# Patient Record
Sex: MALE | Race: WHITE | HISPANIC OR LATINO | Employment: FULL TIME | URBAN - METROPOLITAN AREA
[De-identification: names, ages, dates, MRNs, and addresses within clinical notes are randomized per-mention and may not be internally consistent; named-entity substitution may affect disease eponyms.]

---

## 2017-01-25 ENCOUNTER — OFFICE VISIT (OUTPATIENT)
Dept: MEDICAL GROUP | Facility: CLINIC | Age: 22
End: 2017-01-25
Payer: COMMERCIAL

## 2017-01-25 VITALS
WEIGHT: 133 LBS | TEMPERATURE: 97.7 F | RESPIRATION RATE: 16 BRPM | HEART RATE: 87 BPM | OXYGEN SATURATION: 99 % | BODY MASS INDEX: 22.16 KG/M2 | DIASTOLIC BLOOD PRESSURE: 60 MMHG | SYSTOLIC BLOOD PRESSURE: 110 MMHG | HEIGHT: 65 IN

## 2017-01-25 DIAGNOSIS — B35.4 DERMATOPHYTOSIS OF THE TRUNK: ICD-10-CM

## 2017-01-25 DIAGNOSIS — R42 VERTIGO: ICD-10-CM

## 2017-01-25 PROCEDURE — 99213 OFFICE O/P EST LOW 20 MIN: CPT | Performed by: FAMILY MEDICINE

## 2017-01-25 NOTE — PROGRESS NOTES
"CC: vertigo, rash    HPI:   Brijesh presents today with the following.    1. Vertigo  He has been having increased temperature, head pressure and vertigo the past few days.  Some ear pressure.    2. Skin change  A rash right lower trunk and anterior abdomen has been present about a month. He has used left over ketoconazole which is helpful.      Patient Active Problem List    Diagnosis Date Noted   • Elevated liver enzymes 06/30/2016   • Tension headache 12/05/2013       Current Outpatient Prescriptions   Medication Sig Dispense Refill   • DPH-Lido-AlHydr-MgHydr-Simeth (FIRST-MOUTHWASH BLM) Suspension Take 10 mL by mouth every 6 hours as needed for up to 14 doses. 140 mL 0   • ketoconazole (NIZORAL) 2 % Cream Apply to affected area daily (Patient not taking: Reported on 6/26/2016) 30 g 0     No current facility-administered medications for this visit.         Allergies as of 01/25/2017 - Juan Luis as Reviewed 01/25/2017   Allergen Reaction Noted   • Tylenol  07/19/2016        ROS: As per HPI.    /60 mmHg  Pulse 87  Temp(Src) 36.5 °C (97.7 °F)  Resp 16  Ht 1.651 m (5' 5\")  Wt 60.328 kg (133 lb)  BMI 22.13 kg/m2  SpO2 99%    Physical Exam:  Gen:         Alert and oriented, No apparent distress.  Lucid and fluent.  HEENT:   EOMI, PERRLA, oropharynx well hydrated, no exudate.  TMs normal bilaterally, moderate soft wax.  Neck:       Shotty cervical adenopathy.    Lungs:     Clear to auscultation bilaterally, good air movement  CV:          Regular rate and rhythm. No murmurs, rubs or gallops.               Ext:          No clubbing, cyanosis, edema.  Skin:        Patch of slight scale and pigmentation right lateral lower trunk and anterior trunk    Assessment and Plan.   21 y.o. male with the following issues.    1. Vertigo  Possibly resolving viral reaction, unclear.    2. Dermatophytosis of the trunk  He will continue to use the ketoconazole as needed.  He will use caress or neutrogena soap, sparingly.          "

## 2017-01-25 NOTE — MR AVS SNAPSHOT
"        Brijesh MannIna   2017 1:20 PM   Office Visit   MRN: [id number]    Department:  [facility]   Dept Phone:  [phone]    Description:  Male : 1995   Provider:  Vi Mcneal M.D.           Reason for Visit     Dizziness           Allergies as of 2017     Allergen Noted Reactions    Tylenol 2016       Due to liver enzymes elevation      You were diagnosed with     Vertigo   [[id number]]       Dermatophytosis of the trunk   [[id number]]         Vital Signs     Blood Pressure Pulse Temperature Respirations Height Weight    110/60 mmHg 87 36.5 °C (97.7 °F) 16 1.651 m (5' 5\") 60.328 kg (133 lb)    Body Mass Index Oxygen Saturation Smoking Status             22.13 kg/m2 99% Never Smoker          Basic Information     Date Of Birth Sex Race Ethnicity Preferred Language    1995 Male  or  Unknown English      Problem List              ICD-10-CM Priority Class Noted - Resolved    Tension headache G44.209   2013 - Present    Elevated liver enzymes R74.8   2016 - Present      Health Maintenance        Date Due Completion Dates    IMM HPV VACCINE (1 of 3 - Male 3 Dose Series) 2005 ---    IMM INFLUENZA (1) 2016 10/16/2010    IMM DTaP/Tdap/Td Vaccine (7 - Td) 10/18/2016 10/18/2006, 1998, 1998, 1996, 1995, 1994, 1994, 1994            Current Immunizations     Dtap Vaccine 1998, 1998, 1996, 1995, 1994, 1994, 1994    Hepatitis A Vaccine, Ped/Adol 2006, 2005    Hepatitis B Vaccine Non-Recombivax (Ped/Adol) 1999, 10/9/1998, 1998    Hib Vaccine (Prp-d) Historical Data 1996, 1995, 1995, 1994    Influenza LAIV (Nasal) 10/16/2010    MMR Vaccine 1998, 1997    Measles Vaccine-Historical Data 3/27/1995    Meningococcal Conjugate Vaccine MCV4 (Menactra) 10/17/2008    OPV - Historical Data 1998, 1996, 1995, 1994, 1994, " 1994    Tdap Vaccine 10/18/2006    Varicella Vaccine Live 10/18/2006, 7/1/1998      Below and/or attached are the medications your provider expects you to take. Review all of your home medications and newly ordered medications with your provider and/or pharmacist. Follow medication instructions as directed by your provider and/or pharmacist. Please keep your medication list with you and share with your provider. Update the information when medications are discontinued, doses are changed, or new medications (including over-the-counter products) are added; and carry medication information at all times in the event of emergency situations     Allergies:  TYLENOL - (reactions not documented)               Medications  Valid as of: January 25, 2017 -  2:26 PM    Generic Name Brand Name Tablet Size Instructions for use    DPH-Lido-AlHydr-MgHydr-Simeth (Suspension) MAGIC MOUTHWASH BLM  Take 10 mL by mouth every 6 hours as needed for up to 14 doses.        Ketoconazole (Cream) NIZORAL 2 % Apply to affected area daily        .                 Medicines prescribed today were sent to:     [facility] [address] [place] [address] AT [address] [facility] [address]    Phone: [phone] Fax: [phone]    Open 24 Hours?: No      Medication refill instructions:       If your prescription bottle indicates you have medication refills left, it is not necessary to call your provider’s office. Please contact your pharmacy and they will refill your medication.    If your prescription bottle indicates you do not have any refills left, you may request refills at any time through one of the following ways: The online Mobile2Me system (except Urgent Care), by calling your provider’s office, or by asking your pharmacy to contact your provider’s office with a refill request. Medication refills are processed only during regular business hours and may not be available until the  next business day. Your provider may request additional information or to have a follow-up visit with you prior to refilling your medication.   *Please Note: Medication refills are assigned a new Rx number when refilled electronically. Your pharmacy may indicate that no refills were authorized even though a new prescription for the same medication is available at the pharmacy. Please request the medicine by name with the pharmacy before contacting your provider for a refill.           [employer] Access Code: Activation code not generated  Current [employer] Status: Active

## 2017-01-31 ENCOUNTER — OFFICE VISIT (OUTPATIENT)
Dept: MEDICAL GROUP | Facility: CLINIC | Age: 22
End: 2017-01-31
Payer: COMMERCIAL

## 2017-01-31 VITALS
HEIGHT: 65 IN | HEART RATE: 100 BPM | RESPIRATION RATE: 16 BRPM | TEMPERATURE: 98.6 F | OXYGEN SATURATION: 97 % | WEIGHT: 136 LBS | SYSTOLIC BLOOD PRESSURE: 120 MMHG | DIASTOLIC BLOOD PRESSURE: 70 MMHG | BODY MASS INDEX: 22.66 KG/M2

## 2017-01-31 DIAGNOSIS — G44.209 TENSION-TYPE HEADACHE, NOT INTRACTABLE, UNSPECIFIED CHRONICITY PATTERN: ICD-10-CM

## 2017-01-31 PROCEDURE — 99214 OFFICE O/P EST MOD 30 MIN: CPT | Performed by: NURSE PRACTITIONER

## 2017-01-31 NOTE — MR AVS SNAPSHOT
"        Brijesh MannIna   2017 5:00 PM   Office Visit   MRN: [id number]    Department:  [facility]   Dept Phone:  [phone]    Description:  Male : 1995   Provider:  ARTHUR Woody           Reason for Visit     Follow-Up headaches, vertigo      Allergies as of 2017     Allergen Noted Reactions    Tylenol 2016       Due to liver enzymes elevation      You were diagnosed with     Vertigo   [[id number]]         Vital Signs     Blood Pressure Pulse Temperature Respirations Height Weight    120/70 mmHg 100 37 °C (98.6 °F) 16 1.651 m (5' 5\") 61.689 kg (136 lb)    Body Mass Index Oxygen Saturation Smoking Status             22.63 kg/m2 97% Never Smoker          Basic Information     Date Of Birth Sex Race Ethnicity Preferred Language    1995 Male  or  Unknown English      Problem List              ICD-10-CM Priority Class Noted - Resolved    Tension headache G44.209   2013 - Present    Elevated liver enzymes R74.8   2016 - Present      Health Maintenance        Date Due Completion Dates    IMM HPV VACCINE (1 of 3 - Male 3 Dose Series) 2005 ---    IMM INFLUENZA (1) 2016 10/16/2010    IMM DTaP/Tdap/Td Vaccine (7 - Td) 10/18/2016 10/18/2006, 1998, 1998, 1996, 1995, 1994, 1994, 1994            Current Immunizations     Dtap Vaccine 1998, 1998, 1996, 1995, 1994, 1994, 1994    Hepatitis A Vaccine, Ped/Adol 2006, 2005    Hepatitis B Vaccine Non-Recombivax (Ped/Adol) 1999, 10/9/1998, 1998    Hib Vaccine (Prp-d) Historical Data 1996, 1995, 1995, 1994    Influenza LAIV (Nasal) 10/16/2010    MMR Vaccine 1998, 1997    Measles Vaccine-Historical Data 3/27/1995    Meningococcal Conjugate Vaccine MCV4 (Menactra) 10/17/2008    OPV - Historical Data 1998, 1996, 1995, 1994, 1994, 1994    Tdap Vaccine " 10/18/2006    Varicella Vaccine Live 10/18/2006, 7/1/1998      Below and/or attached are the medications your provider expects you to take. Review all of your home medications and newly ordered medications with your provider and/or pharmacist. Follow medication instructions as directed by your provider and/or pharmacist. Please keep your medication list with you and share with your provider. Update the information when medications are discontinued, doses are changed, or new medications (including over-the-counter products) are added; and carry medication information at all times in the event of emergency situations     Allergies:  TYLENOL - (reactions not documented)               Medications  Valid as of: January 31, 2017 -  5:29 PM    Generic Name Brand Name Tablet Size Instructions for use    DPH-Lido-AlHydr-MgHydr-Simeth (Suspension) MAGIC MOUTHWASH BLM  Take 10 mL by mouth every 6 hours as needed for up to 14 doses.        Ketoconazole (Cream) NIZORAL 2 % Apply to affected area daily        .                 Medicines prescribed today were sent to:     [facility] [address] [place] [address] AT [address] [facility] [address]    Phone: [phone] Fax: [phone]    Open 24 Hours?: No      Medication refill instructions:       If your prescription bottle indicates you have medication refills left, it is not necessary to call your provider’s office. Please contact your pharmacy and they will refill your medication.    If your prescription bottle indicates you do not have any refills left, you may request refills at any time through one of the following ways: The online LightUp system (except Urgent Care), by calling your provider’s office, or by asking your pharmacy to contact your provider’s office with a refill request. Medication refills are processed only during regular business hours and may not be available until the next business day. Your  provider may request additional information or to have a follow-up visit with you prior to refilling your medication.   *Please Note: Medication refills are assigned a new Rx number when refilled electronically. Your pharmacy may indicate that no refills were authorized even though a new prescription for the same medication is available at the pharmacy. Please request the medicine by name with the pharmacy before contacting your provider for a refill.           MyChart Access Code: Activation code not generated  Current [employer] Status: Active

## 2017-02-01 NOTE — PROGRESS NOTES
CC: Follow-Up        HPI:     Brijesh presents today for the followin. Tension-type headache, not intractable, unspecified chronicity pattern  Here today to FU for  on headaches. He tells me his symptoms have improved. He also has a symptom which he just states is not vertigo if he's had vertigo previously however he describes it almost as a vibration comes from the back of his head towards the front of his temples. It's a bilateral sensation.  He does not have any eye globe pain. There is no any unilateral temple pain. This vibratory sense while last second, common go throughout the day. Is self resolving.  He has not had any associated trauma to the head.      He tells me his friend had meningitis years ago and is worried this could be meningitis. He tells me is he looked up his symptoms online and is worried it could be a brain tumor.    He tells me that previous to last week's was only sleeping 4 hours a night and related to a large workload, school load, planning a wedding etc. He has since make sure he sleeping 8 hours a night and he does feel his symptoms have improved. He started eating before he went to work and maybe this helps well.  Total headaches is only had 3 since his symptoms started. They can be in different places but they seem more commonly to be at the back of the head towards the base of the neck. He describes the more as mild, pressure, constant not pulsating. He has not tried any over-the-counter pain relievers. They resolve naturally within hours. They're better if he lays down and rests.    No associated symptoms of nausea vomiting, problems with balance, changes in his vision.  Not associated with a fever. He did have one isolated event of a temperature of 100-101 last week that was associated with a viral URI.  No sweating or tearing on the face.    We did review his chart and approximately 3 years ago he appears to have a similar presentation of headaches with which described some  "dizziness. He was seen at that time and ruled out for temporal arteritis. He doesn't really remember those symptoms fail to see if they are exactly the same or not.    Current Outpatient Prescriptions   Medication Sig Dispense Refill   • DPH-Lido-AlHydr-MgHydr-Simeth (FIRST-MOUTHWASH BLM) Suspension Take 10 mL by mouth every 6 hours as needed for up to 14 doses. 140 mL 0   • ketoconazole (NIZORAL) 2 % Cream Apply to affected area daily (Patient not taking: Reported on 6/26/2016) 30 g 0     No current facility-administered medications for this visit.     Social History   Substance Use Topics   • Smoking status: Never Smoker    • Smokeless tobacco: Never Used   • Alcohol Use: No      Comment: very rarely     I reviewed patients allergies, problem list and medications today in [facility].    ROS: Any/all pertinent positives listed in the HPI, otherwise all others reviewed are negative today.      /70 mmHg  Pulse 100  Temp(Src) 37 °C (98.6 °F)  Resp 16  Ht 1.651 m (5' 5\")  Wt 61.689 kg (136 lb)  BMI 22.63 kg/m2  SpO2 97%    Exam:    Gen: Alert and oriented, No apparent distress. WDWN  Psych: A+Ox3, normal affect and mood  Skin: Warm, dry and intact. Good turgor   No rashes in visible areas.  Eye: Conjunctiva clear, lids normal  ENMT: Lips without lesions, good dentition   Oropharynx clear. TMs unremarkable bilaterally with the exclusion of some soft wax present. No pain is pressure over the frontal or maxillary sinuses bilaterally  Neck: No Lymphadenopathy, Thyromegaly, Bruits.   Trachea midline, no masses  Lungs: Clear to auscultation bilaterally, no rales or rhonchi   Unlabored respiratory effort.   CV: Regular rate and rhythm, S1, S2. No murmurs.   No Edema  Neuro:    CNs: II:PERRLA, III/IV/VI EOM without ptosis or nystagmus, V motor intact, VII facial movements without asymmetry or weakness, iX/X palate midline, XI Neck strength intact, XII tongue protuded midline.  Motor:No noted atrophy or deformity of " "motion.  Coordination: No dysmetria with rapid movements, \"finger-to-nose-to-finger\" with ease  Gait: Normal gait  Cerebellar signs: Absent  Tremors : Absent  Normal speech. Normal facial movement.  Romberg normal      Assessment and Plan.   21 y.o. male with the following issues.    1. Tension-type headache, not intractable, unspecified chronicity pattern   stable. Suspect this is more stress related given the presentation of his headaches. Reviewed with this patient. We discussed self-care in terms of making sure he gets greater than 6 hours of sleep at night, routine meals water etc.  Symptoms are improving at this point. If he has any worsening or new or changing symptoms, extremity loss of smell      Over 50% of this 25 minute visit was spent on counseling and coordination of care regarding medication management, side effects, and complications in addition to extensive review of her history, current medications and today's plan of care.    "

## 2017-03-30 ENCOUNTER — OFFICE VISIT (OUTPATIENT)
Dept: MEDICAL GROUP | Facility: CLINIC | Age: 22
End: 2017-03-30
Payer: COMMERCIAL

## 2017-03-30 VITALS
SYSTOLIC BLOOD PRESSURE: 100 MMHG | HEIGHT: 64 IN | OXYGEN SATURATION: 99 % | TEMPERATURE: 97.9 F | DIASTOLIC BLOOD PRESSURE: 62 MMHG | HEART RATE: 58 BPM | RESPIRATION RATE: 16 BRPM

## 2017-03-30 DIAGNOSIS — M25.562 ACUTE PAIN OF LEFT KNEE: ICD-10-CM

## 2017-03-30 PROCEDURE — 99213 OFFICE O/P EST LOW 20 MIN: CPT | Performed by: NURSE PRACTITIONER

## 2017-03-30 RX ORDER — IBUPROFEN 800 MG/1
800 TABLET ORAL EVERY 8 HOURS PRN
Qty: 30 TAB | Refills: 1 | Status: SHIPPED | OUTPATIENT
Start: 2017-03-30 | End: 2021-08-02

## 2017-03-30 ASSESSMENT — PAIN SCALES - GENERAL: PAINLEVEL: 3=SLIGHT PAIN

## 2017-03-30 ASSESSMENT — ENCOUNTER SYMPTOMS
FEVER: 0
CHILLS: 0
SENSORY CHANGE: 0
TINGLING: 0
MYALGIAS: 1

## 2017-03-30 NOTE — MR AVS SNAPSHOT
"        Brijesh MannIna   3/30/2017 9:20 AM   Office Visit   MRN: [id number]    Department:  [facility]   Dept Phone:  [phone]    Description:  Male : 1995   Provider:  ARTHUR Mcdaniel           Reason for Visit     Knee Pain x 2 days      Allergies as of 3/30/2017     Allergen Noted Reactions    Tylenol 2016       Due to liver enzymes elevation      You were diagnosed with     Acute pain of left knee   [[id number]]         Vital Signs     Blood Pressure Pulse Temperature Respirations Height Oxygen Saturation    100/62 mmHg 58 36.6 °C (97.9 °F) 16 1.626 m (5' 4.02\") 99%    Smoking Status                   Never Smoker            Basic Information     Date Of Birth Sex Race Ethnicity Preferred Language    1995 Male  or  Unknown English      Problem List              ICD-10-CM Priority Class Noted - Resolved    Tension headache G44.209   2013 - Present    Elevated liver enzymes R74.8   2016 - Present      Health Maintenance        Date Due Completion Dates    IMM HPV VACCINE (1 of 3 - Male 3 Dose Series) 2005 ---    IMM INFLUENZA (1) 2016 10/16/2010    IMM DTaP/Tdap/Td Vaccine (7 - Td) 10/18/2016 10/18/2006, 1998, 1998, 1996, 1995, 1994, 1994, 1994            Current Immunizations     Dtap Vaccine 1998, 1998, 1996, 1995, 1994, 1994, 1994    Hepatitis A Vaccine, Ped/Adol 2006, 2005    Hepatitis B Vaccine Non-Recombivax (Ped/Adol) 1999, 10/9/1998, 1998    Hib Vaccine (Prp-d) Historical Data 1996, 1995, 1995, 1994    Influenza LAIV (Nasal) 10/16/2010    MMR Vaccine 1998, 1997    Measles Vaccine-Historical Data 3/27/1995    Meningococcal Conjugate Vaccine MCV4 (Menactra) 10/17/2008    OPV - Historical Data 1998, 1996, 1995, 1994, 1994, 1994    Tdap Vaccine 10/18/2006    Varicella Vaccine Live " 10/18/2006, 7/1/1998      Below and/or attached are the medications your provider expects you to take. Review all of your home medications and newly ordered medications with your provider and/or pharmacist. Follow medication instructions as directed by your provider and/or pharmacist. Please keep your medication list with you and share with your provider. Update the information when medications are discontinued, doses are changed, or new medications (including over-the-counter products) are added; and carry medication information at all times in the event of emergency situations     Allergies:  TYLENOL - (reactions not documented)               Medications  Valid as of: March 30, 2017 -  9:40 AM    Generic Name Brand Name Tablet Size Instructions for use    DPH-Lido-AlHydr-MgHydr-Simeth (Suspension) MAGIC MOUTHWASH BLM  Take 10 mL by mouth every 6 hours as needed for up to 14 doses.        Ibuprofen (Tab) MOTRIN 800 MG Take 1 Tab by mouth every 8 hours as needed.        Ketoconazole (Cream) NIZORAL 2 % Apply to affected area daily        .                 Medicines prescribed today were sent to:     [facility] [address] [place] [address] AT [address] [facility] [address]    Phone: [phone] Fax: [phone]    Open 24 Hours?: No      Medication refill instructions:       If your prescription bottle indicates you have medication refills left, it is not necessary to call your provider’s office. Please contact your pharmacy and they will refill your medication.    If your prescription bottle indicates you do not have any refills left, you may request refills at any time through one of the following ways: The online [employer] system (except Urgent Care), by calling your provider’s office, or by asking your pharmacy to contact your provider’s office with a refill request. Medication refills are processed only during regular business hours and may not be  available until the next business day. Your provider may request additional information or to have a follow-up visit with you prior to refilling your medication.   *Please Note: Medication refills are assigned a new Rx number when refilled electronically. Your pharmacy may indicate that no refills were authorized even though a new prescription for the same medication is available at the pharmacy. Please request the medicine by name with the pharmacy before contacting your provider for a refill.        Instructions    Knee Sprain  A knee sprain is a tear in one of the strong, fibrous tissues that connect the bones (ligaments) in your knee. The severity of the sprain depends on how much of the ligament is torn. The tear can be either partial or complete.  CAUSES   Often, sprains are a result of a fall or injury. The force of the impact causes the fibers of your ligament to stretch too much. This excess tension causes the fibers of your ligament to tear.  SIGNS AND SYMPTOMS   You may have some loss of motion in your knee. Other symptoms include:  · Bruising.  · Pain in the knee area.  · Tenderness of the knee to the touch.  · Swelling.  DIAGNOSIS   To diagnose a knee sprain, your health care provider will physically examine your knee. Your health care provider may also suggest an X-ray exam of your knee to make sure no bones are broken.  TREATMENT   If your ligament is only partially torn, treatment usually involves keeping the knee in a fixed position (immobilization) or bracing your knee for activities that require movement for several weeks. To do this, your health care provider will apply a bandage, cast, or splint to keep your knee from moving and to support your knee during movement until it heals. For a partially torn ligament, the healing process usually takes 4-6 weeks.  If your ligament is completely torn, depending on which ligament it is, you may need surgery to reconnect the ligament to the bone or  reconstruct it. After surgery, a cast or splint may be applied and will need to stay on your knee for 4-6 weeks while your ligament heals.  HOME CARE INSTRUCTIONS  · Keep your injured knee elevated to decrease swelling.  · To ease pain and swelling, apply ice to the injured area:  ¨ Put ice in a plastic bag.  ¨ Place a towel between your skin and the bag.  ¨ Leave the ice on for 20 minutes, 2-3 times a day.  · Only take medicine for pain as directed by your health care provider.  · Do not leave your knee unprotected until pain and stiffness go away (usually 4-6 weeks).  · If you have a cast or splint, do not allow it to get wet. If you have been instructed not to remove it, cover it with a plastic bag when you shower or bathe. Do not swim.  · Your health care provider may suggest exercises for you to do during your recovery to prevent or limit permanent weakness and stiffness.  SEEK IMMEDIATE MEDICAL CARE IF:  · Your cast or splint becomes damaged.  · Your pain becomes worse.  · You have significant pain, swelling, or numbness below the cast or splint.  MAKE SURE YOU:  · Understand these instructions.  · Will watch your condition.  · Will get help right away if you are not doing well or get worse.     This information is not intended to replace advice given to you by your health care provider. Make sure you discuss any questions you have with your health care provider.     Document Released: 12/18/2006 Document Revised: 01/08/2016 Document Reviewed: 07/30/2014  [employer] Interactive Patient Education ©2016 [employer]            [employer] Access Code: Activation code not generated  Current [employer] Status: Active

## 2017-03-30 NOTE — PROGRESS NOTES
Chief Complaint   Patient presents with   • Knee Pain     x 2 days       HISTORY OF PRESENT ILLNESS: Patient is a 21 y.o. male established patient who presents today due to left knee pain. Pt reports that he went to gym on Tuesday and felt knee locked for twice but then it got better so that he did not pay much attention to it. On Wednesday, he play soccer with his sister and feels the same again. After that, he start noticing left knee swelling and pressure on knee. He is unable to place weight on his left leg. He did not apply any ice or heating pad or taking any OTC medication to alleviate the pain. He does not feel there is any physical contact to cause knee injury while playing soccer. He does not fall. He is still able to walk and bend his knee but the pain level will go from 0/10 to 4/10 when he bends his knee.       Patient Active Problem List    Diagnosis Date Noted   • Elevated liver enzymes 06/30/2016   • Tension headache 12/05/2013       Allergies:Tylenol    Current Outpatient Prescriptions   Medication Sig Dispense Refill   • DPH-Lido-AlHydr-MgHydr-Simeth (FIRST-MOUTHWASH BLM) Suspension Take 10 mL by mouth every 6 hours as needed for up to 14 doses. 140 mL 0   • ketoconazole (NIZORAL) 2 % Cream Apply to affected area daily (Patient not taking: Reported on 6/26/2016) 30 g 0     No current facility-administered medications for this visit.       Social History   Substance Use Topics   • Smoking status: Never Smoker    • Smokeless tobacco: Never Used   • Alcohol Use: No      Comment: very rarely       No family status information on file.     Family History   Problem Relation Age of Onset   • Hypertension Maternal Grandmother    • Diabetes Maternal Grandmother    • Other Mother      headache         ROS:  Review of Systems   Constitutional: Negative for fever and chills.   Musculoskeletal: Positive for myalgias.        Knee pain especially when bending   Neurological: Negative for tingling and sensory  "change.        Objective:     Blood pressure 100/62, pulse 58, temperature 36.6 °C (97.9 °F), resp. rate 16, height 1.626 m (5' 4.02\"), SpO2 99 %.     Physical Exam:  Physical Exam   Constitutional: He is oriented to person, place, and time and well-developed, well-nourished, and in no distress.   HENT:   Head: Normocephalic and atraumatic.   Eyes: Conjunctivae are normal.   Neck: Neck supple. No JVD present.   Cardiovascular: Normal rate.    Abdominal: Soft.   Musculoskeletal: Normal range of motion. He exhibits edema ( a LITTLE bit left KNEE swelling compared to right knee. ) and tenderness ( pressure to left knee. The knee is stable. Negative anterior drawer test. stable of the collateral ligment. Pt reports that most of the time he feels pressure, not pain when I am doing PE.).   Neurological: He is alert and oriented to person, place, and time. No cranial nerve deficit.   Skin: Skin is warm. No erythema.   Vitals reviewed.        Assessment/Plan:  1. Acute pain of left knee, knee sprain  - ibuprofen (MOTRIN) 800 MG Tab; Take 1 Tab by mouth every 8 hours as needed.  Dispense: 30 Tab; Refill: 1  - rest and elevate knee. Limit walking and long standing. Apply heating pad. Pt works at [employer] and need work note for today and Monday. He is off on weekend and will be able to take rest. Work note given to pt per request. He declined xray and would like to see how ibuprofen works. Future MRI might be considered if worsening pain and swelling or worsening physical function since pt was playing soccer. Pt verbalized understanding.         "

## 2017-03-30 NOTE — Clinical Note
March 30, 2017    To Whom It May Concern:         This is confirmation that Brijesh Henrynorman Santana attended his scheduled appointment with ARTHUR Mcdaniel on 3/30/17. Patient might need light work for 4-5 days. No heaving lifting, no pressure on left leg. Limit long walking and long standing to 2-3 hours per shift. Future MRI might be needed if worsening symptoms.          If you have any questions please do not hesitate to call me at the phone number listed below.    Sincerely,          JANICE McdanielRADOLPH.  [phone]

## 2017-04-20 ENCOUNTER — OFFICE VISIT (OUTPATIENT)
Dept: URGENT CARE | Facility: PHYSICIAN GROUP | Age: 22
End: 2017-04-20
Payer: COMMERCIAL

## 2017-04-20 VITALS
HEART RATE: 70 BPM | DIASTOLIC BLOOD PRESSURE: 64 MMHG | RESPIRATION RATE: 14 BRPM | OXYGEN SATURATION: 98 % | TEMPERATURE: 99.3 F | BODY MASS INDEX: 21.96 KG/M2 | WEIGHT: 128 LBS | SYSTOLIC BLOOD PRESSURE: 102 MMHG

## 2017-04-20 DIAGNOSIS — H81.13 BPPV (BENIGN PAROXYSMAL POSITIONAL VERTIGO), BILATERAL: ICD-10-CM

## 2017-04-20 DIAGNOSIS — R11.0 NAUSEA: ICD-10-CM

## 2017-04-20 PROCEDURE — 99214 OFFICE O/P EST MOD 30 MIN: CPT | Performed by: PHYSICIAN ASSISTANT

## 2017-04-20 RX ORDER — CETIRIZINE HYDROCHLORIDE 10 MG/1
10 TABLET ORAL DAILY
COMMUNITY
End: 2021-08-02

## 2017-04-20 RX ORDER — ONDANSETRON 4 MG/1
4 TABLET, FILM COATED ORAL EVERY 6 HOURS PRN
Qty: 20 EACH | Refills: 0 | Status: SHIPPED | OUTPATIENT
Start: 2017-04-20 | End: 2017-04-25

## 2017-04-20 ASSESSMENT — ENCOUNTER SYMPTOMS
CHILLS: 0
VERTIGO: 1
HEADACHES: 0
DIZZINESS: 1
ABDOMINAL PAIN: 0
JOINT SWELLING: 0
FEVER: 0
COUGH: 0
VISUAL CHANGE: 0
VOMITING: 0
NAUSEA: 1
SORE THROAT: 0
DIAPHORESIS: 0

## 2017-04-20 NOTE — Clinical Note
April 20, 2017         Patient: Brijesh Santana   YOB: 1995   Date of Visit: 4/20/2017           To Whom it May Concern:    Brijesh Santana was seen in my clinic on 4/20/2017. Please excuse this patient from work today due to recent ailment. Patient may return to work on Monday if symptoms have improved.   If you have any questions or concerns, please don't hesitate to call.        Sincerely,           Timothy Nice PA-C  Electronically Signed

## 2017-04-20 NOTE — MR AVS SNAPSHOT
Brijeshalex Wilsonmael Esther   2017 5:45 PM   Office Visit   MRN: [id number]    Department:  [facility]   Dept Phone:  [phone]    Description:  Male : 1995   Provider:  Timothy Nice PA-C           Reason for Visit     Vertigo worse with movement or position change, also nausea- pt woke to it this morning      Allergies as of 2017     Allergen Noted Reactions    Tylenol 2016       Due to liver enzymes elevation      You were diagnosed with     BPPV (benign paroxysmal positional vertigo), bilateral   [[id number]]         Vital Signs     Blood Pressure Pulse Temperature Respirations Weight Oxygen Saturation    102/64 mmHg 70 37.4 °C (99.3 °F) 14 58.06 kg (128 lb) 98%    Smoking Status                   Never Smoker            Basic Information     Date Of Birth Sex Race Ethnicity Preferred Language    1995 Male  or  Unknown English      Problem List              ICD-10-CM Priority Class Noted - Resolved    Tension headache G44.209   2013 - Present    Elevated liver enzymes R74.8   2016 - Present      Health Maintenance        Date Due Completion Dates    IMM HPV VACCINE (1 of 3 - Male 3 Dose Series) 2005 ---    IMM DTaP/Tdap/Td Vaccine (7 - Td) 10/18/2016 10/18/2006, 1998, 1998, 1996, 1995, 1994, 1994, 1994            Current Immunizations     Dtap Vaccine 1998, 1998, 1996, 1995, 1994, 1994, 1994    Hepatitis A Vaccine, Ped/Adol 2006, 2005    Hepatitis B Vaccine Non-Recombivax (Ped/Adol) 1999, 10/9/1998, 1998    Hib Vaccine (Prp-d) Historical Data 1996, 1995, 1995, 1994    Influenza LAIV (Nasal) 10/16/2010    MMR Vaccine 1998, 1997    Measles Vaccine-Historical Data 3/27/1995    Meningococcal Conjugate Vaccine MCV4 (Menactra) 10/17/2008    OPV - Historical Data 1998, 1996, 1995, 1994, 1994, 1994      Tdap Vaccine 10/18/2006    Varicella Vaccine Live 10/18/2006, 7/1/1998      Below and/or attached are the medications your provider expects you to take. Review all of your home medications and newly ordered medications with your provider and/or pharmacist. Follow medication instructions as directed by your provider and/or pharmacist. Please keep your medication list with you and share with your provider. Update the information when medications are discontinued, doses are changed, or new medications (including over-the-counter products) are added; and carry medication information at all times in the event of emergency situations     Allergies:  TYLENOL - (reactions not documented)               Medications  Valid as of: April 20, 2017 -  6:30 PM    Generic Name Brand Name Tablet Size Instructions for use    Cetirizine HCl (Tab) ZYRTEC 10 MG Take 10 mg by mouth every day.        DPH-Lido-AlHydr-MgHydr-Simeth (Suspension) MAGIC MOUTHWASH BLM  Take 10 mL by mouth every 6 hours as needed for up to 14 doses.        Ibuprofen (Tab) MOTRIN 800 MG Take 1 Tab by mouth every 8 hours as needed.        Ketoconazole (Cream) NIZORAL 2 % Apply to affected area daily        Ondansetron HCl (Tab) ZOFRAN 4 MG Take 1 Tab by mouth every 6 hours as needed for Nausea/Vomiting for up to 5 days.        .                 Medicines prescribed today were sent to:     [facility] [address] [place] [address] AT [address] [address]    Phone: [phone] Fax: [phone]    Open 24 Hours?: No      Medication refill instructions:       If your prescription bottle indicates you have medication refills left, it is not necessary to call your provider’s office. Please contact your pharmacy and they will refill your medication.    If your prescription bottle indicates you do not have any refills left, you may request refills at any time through one of the following ways: The  online [employer] system (except Urgent Care), by calling your provider’s office, or by asking your pharmacy to contact your provider’s office with a refill request. Medication refills are processed only during regular business hours and may not be available until the next business day. Your provider may request additional information or to have a follow-up visit with you prior to refilling your medication.   *Please Note: Medication refills are assigned a new Rx number when refilled electronically. Your pharmacy may indicate that no refills were authorized even though a new prescription for the same medication is available at the pharmacy. Please request the medicine by name with the pharmacy before contacting your provider for a refill.           [employer] Access Code: Activation code not generated  Current [employer] Status: Active

## 2017-04-21 ASSESSMENT — ENCOUNTER SYMPTOMS
EYE DISCHARGE: 0
SEIZURES: 0
NECK PAIN: 0
DIARRHEA: 0
SENSORY CHANGE: 0
EYE REDNESS: 0
PHOTOPHOBIA: 0
EYE PAIN: 0
MYALGIAS: 0
FOCAL WEAKNESS: 0
DOUBLE VISION: 0
SPEECH CHANGE: 0
BLURRED VISION: 0
TINGLING: 0

## 2017-04-21 NOTE — PROGRESS NOTES
"Subjective:      Brijesh Santana is a 21 y.o. male who presents with Vertigo          Pt is 22 y/o female who presents with vertigo with head movements and lying down and turning on his side for the last day. He reports similar hx. A few months ago- however that was more light-headedness and feeling unsteady. He denies any recent illness or \"really feeling that bad\". He reports nausea with the episodes however denies any vomiting.   Dizziness  This is a new problem. The current episode started yesterday. The problem occurs intermittently. The problem has been waxing and waning. Associated symptoms include nausea and vertigo. Pertinent negatives include no abdominal pain, chest pain, chills, congestion, coughing, diaphoresis, fever, headaches, joint swelling, myalgias, neck pain, rash, sore throat, visual change or vomiting. The symptoms are aggravated by bending (turning head, and lying down and moving. ). He has tried nothing for the symptoms.       Review of Systems   Constitutional: Negative for fever, chills and diaphoresis.   HENT: Negative for congestion and sore throat.    Eyes: Negative for blurred vision, double vision, photophobia, pain, discharge and redness.   Respiratory: Negative for cough.    Cardiovascular: Negative for chest pain and leg swelling.   Gastrointestinal: Positive for nausea. Negative for vomiting, abdominal pain and diarrhea.   Genitourinary: Negative for dysuria and urgency.   Musculoskeletal: Negative for myalgias, joint swelling and neck pain.   Skin: Negative for rash.   Neurological: Positive for dizziness and vertigo. Negative for tingling, sensory change, speech change, focal weakness, seizures and headaches.          Objective:     /64 mmHg  Pulse 70  Temp(Src) 37.4 °C (99.3 °F)  Resp 14  Wt 58.06 kg (128 lb)  SpO2 98%   PMH:  has a past medical history of Skin lesion of hand (2/2012).  MEDS:   Current outpatient prescriptions:   •  cetirizine (ZYRTEC) 10 MG " Tab, Take 10 mg by mouth every day., Disp: , Rfl:   •  ondansetron (ZOFRAN) 4 MG Tab tablet, Take 1 Tab by mouth every 6 hours as needed for Nausea/Vomiting for up to 5 days., Disp: 20 Each, Rfl: 0  •  ibuprofen (MOTRIN) 800 MG Tab, Take 1 Tab by mouth every 8 hours as needed., Disp: 30 Tab, Rfl: 1  •  DPH-Lido-AlHydr-MgHydr-Simeth (FIRST-MOUTHWASH BLM) Suspension, Take 10 mL by mouth every 6 hours as needed for up to 14 doses., Disp: 140 mL, Rfl: 0  •  ketoconazole (NIZORAL) 2 % Cream, Apply to affected area daily (Patient not taking: Reported on 6/26/2016), Disp: 30 g, Rfl: 0  ALLERGIES:   Allergies   Allergen Reactions   • Tylenol      Due to liver enzymes elevation     SURGHX: History reviewed. No pertinent past surgical history.  SOCHX:  reports that he has never smoked. He has never used smokeless tobacco. He reports that he does not drink alcohol or use illicit drugs.  FH: Family history was reviewed, no pertinent findings to report    Physical Exam   Constitutional: He is oriented to person, place, and time. He appears well-developed and well-nourished.   HENT:   Head: Normocephalic and atraumatic.   Right Ear: External ear normal.   Left Ear: External ear normal.   Mouth/Throat: Oropharynx is clear and moist. No oropharyngeal exudate.   Eyes: EOM are normal. Pupils are equal, round, and reactive to light.   Neck: Normal range of motion. Neck supple.   Cardiovascular: Normal rate and regular rhythm.    Pulmonary/Chest: Effort normal and breath sounds normal.   Musculoskeletal: Normal range of motion. He exhibits no edema or tenderness.   Neurological: He is alert and oriented to person, place, and time. He displays normal reflexes. No cranial nerve deficit. He exhibits normal muscle tone. Coordination normal.   Reproducible vertigo with head position. Pos. sylvia hallpike bilateral- worse on the right.   Espinoza appropriate, neg. Pronator drift, and neg. Rhomberg.      Skin: Skin is warm. No rash noted.    Psychiatric: He has a normal mood and affect. His behavior is normal.   Vitals reviewed.              Assessment/Plan:     1. BPPV (benign paroxysmal positional vertigo), bilateral  - ondansetron (ZOFRAN) 4 MG Tab tablet; Take 1 Tab by mouth every 6 hours as needed for Nausea/Vomiting for up to 5 days.  Dispense: 20 Each; Refill: 0    2. Nausea    Pt. Was given information on [facility]. To do at home- worse on the right. Work note written- until symptoms improve- avoid driving.   Zofran was written to help with nausea. Without any current neuro changes- completely asymptomatic currently.     Patient given precautionary s/sx that mandate immediate follow up and evaluation in the ED. Advised of risks of not doing so.    DDX, Supportive care, and indications for immediate follow-up discussed with patient.    Instructed to return to clinic or nearest emergency department if we are not available for any change in condition, further concerns, or worsening of symptoms.    The patient demonstrated a good understanding and agreed with the treatment plan.

## 2018-07-08 ENCOUNTER — HOSPITAL ENCOUNTER (OUTPATIENT)
Dept: RADIOLOGY | Facility: MEDICAL CENTER | Age: 23
End: 2018-07-08
Attending: PHYSICIAN ASSISTANT
Payer: COMMERCIAL

## 2018-07-08 ENCOUNTER — OFFICE VISIT (OUTPATIENT)
Dept: URGENT CARE | Facility: PHYSICIAN GROUP | Age: 23
End: 2018-07-08
Payer: COMMERCIAL

## 2018-07-08 VITALS
OXYGEN SATURATION: 96 % | DIASTOLIC BLOOD PRESSURE: 68 MMHG | RESPIRATION RATE: 16 BRPM | SYSTOLIC BLOOD PRESSURE: 104 MMHG | BODY MASS INDEX: 24.07 KG/M2 | HEART RATE: 101 BPM | WEIGHT: 141 LBS | HEIGHT: 64 IN | TEMPERATURE: 99.5 F

## 2018-07-08 DIAGNOSIS — R50.9 FEVER, UNSPECIFIED FEVER CAUSE: ICD-10-CM

## 2018-07-08 DIAGNOSIS — J06.9 ACUTE URI: Primary | ICD-10-CM

## 2018-07-08 DIAGNOSIS — R05.9 COUGH: ICD-10-CM

## 2018-07-08 DIAGNOSIS — J98.01 ACUTE BRONCHOSPASM: ICD-10-CM

## 2018-07-08 LAB
FLUAV+FLUBV AG SPEC QL IA: NEGATIVE
INT CON NEG: NEGATIVE
INT CON POS: POSITIVE

## 2018-07-08 PROCEDURE — 99214 OFFICE O/P EST MOD 30 MIN: CPT | Performed by: PHYSICIAN ASSISTANT

## 2018-07-08 PROCEDURE — 71046 X-RAY EXAM CHEST 2 VIEWS: CPT

## 2018-07-08 PROCEDURE — 87804 INFLUENZA ASSAY W/OPTIC: CPT | Performed by: PHYSICIAN ASSISTANT

## 2018-07-08 RX ORDER — PREDNISONE 20 MG/1
20 TABLET ORAL DAILY
Qty: 5 TAB | Refills: 0 | Status: SHIPPED | OUTPATIENT
Start: 2018-07-08 | End: 2018-07-13

## 2018-07-08 RX ORDER — ALBUTEROL SULFATE 90 UG/1
2 AEROSOL, METERED RESPIRATORY (INHALATION) EVERY 4 HOURS PRN
Qty: 1 INHALER | Refills: 0 | Status: SHIPPED | OUTPATIENT
Start: 2018-07-08 | End: 2021-08-02

## 2018-07-08 ASSESSMENT — ENCOUNTER SYMPTOMS
MYALGIAS: 1
SHORTNESS OF BREATH: 0
FEVER: 1
SPUTUM PRODUCTION: 1
WHEEZING: 0
HEMOPTYSIS: 0
RHINORRHEA: 1
COUGH: 1
STRIDOR: 0
SORE THROAT: 0
HEADACHES: 1

## 2018-07-08 NOTE — PROGRESS NOTES
Subjective:      Brijesh Santana is a 23 y.o. male who presents with Cough (with congestion x 4 days with fever up to 101.4 )    PMH:  has a past medical history of Skin lesion of hand (2/2012).  MEDS:   Current Outpatient Prescriptions:   •  cetirizine (ZYRTEC) 10 MG Tab, Take 10 mg by mouth every day., Disp: , Rfl:   •  ibuprofen (MOTRIN) 800 MG Tab, Take 1 Tab by mouth every 8 hours as needed., Disp: 30 Tab, Rfl: 1  •  DPH-Lido-AlHydr-MgHydr-Simeth (FIRST-MOUTHWASH BLM) Suspension, Take 10 mL by mouth every 6 hours as needed for up to 14 doses., Disp: 140 mL, Rfl: 0  •  ketoconazole (NIZORAL) 2 % Cream, Apply to affected area daily (Patient not taking: Reported on 6/26/2016), Disp: 30 g, Rfl: 0  ALLERGIES:   Allergies   Allergen Reactions   • Tylenol      Due to liver enzymes elevation     SURGHX: History reviewed. No pertinent surgical history.  SOCHX:  reports that he has never smoked. He has never used smokeless tobacco. He reports that he does not drink alcohol or use drugs.  FH: Reviewed with patient/family. Not pertinent to this complaint.          Patient presents with:  Cough: with congestion x 4 days with fever up to 101.4 . + family contacts with Flu and PNA.              Cough   This is a new problem. Episode onset: 4 days. The problem has been gradually worsening. The problem occurs every few minutes. The cough is productive of sputum. Associated symptoms include a fever, headaches, myalgias and rhinorrhea. Pertinent negatives include no hemoptysis, sore throat, shortness of breath or wheezing. The symptoms are aggravated by lying down. He has tried body position changes, OTC cough suppressant and rest for the symptoms. The treatment provided no relief. There is no history of asthma.       Review of Systems   Constitutional: Positive for fever and malaise/fatigue.   HENT: Positive for congestion and rhinorrhea. Negative for sore throat.    Respiratory: Positive for cough and sputum  "production. Negative for hemoptysis, shortness of breath, wheezing and stridor.    Musculoskeletal: Positive for myalgias.   Neurological: Positive for headaches.   All other systems reviewed and are negative.         Objective:     /68   Pulse (!) 101   Temp 37.5 °C (99.5 °F)   Resp 16   Ht 1.626 m (5' 4\")   Wt 64 kg (141 lb)   SpO2 96%   BMI 24.20 kg/m²      Physical Exam   Constitutional: He appears well-developed and well-nourished. No distress.   HENT:   Head: Normocephalic.   Right Ear: Tympanic membrane normal.   Left Ear: Tympanic membrane normal.   Nose: Rhinorrhea present.   Mouth/Throat: Uvula is midline and mucous membranes are normal. Posterior oropharyngeal erythema present.   Eyes: Pupils are equal, round, and reactive to light.   Neck: Normal range of motion. Neck supple.   Cardiovascular: Tachycardia present.    Pulmonary/Chest: Effort normal. He has no decreased breath sounds. He has no wheezes. He has rhonchi. He has no rales.   Abdominal: Soft.   Musculoskeletal: Normal range of motion.   Lymphadenopathy:     He has no cervical adenopathy.   Neurological: He is alert.   Skin: Skin is warm and dry.   Psychiatric: He has a normal mood and affect.   Nursing note and vitals reviewed.         FLU: neg  Xray images viewed and interpreted by me, confirmed by radiology:    Neg acute findings.     Assessment/Plan:     1. Acute URI  POCT Influenza A/B    DX-CHEST-2 VIEWS    albuterol 108 (90 Base) MCG/ACT Aero Soln inhalation aerosol    predniSONE (DELTASONE) 20 MG Tab   2. Fever, unspecified fever cause  POCT Influenza A/B    DX-CHEST-2 VIEWS    albuterol 108 (90 Base) MCG/ACT Aero Soln inhalation aerosol    predniSONE (DELTASONE) 20 MG Tab   3. Acute bronchospasm  POCT Influenza A/B    DX-CHEST-2 VIEWS    albuterol 108 (90 Base) MCG/ACT Aero Soln inhalation aerosol    predniSONE (DELTASONE) 20 MG Tab        Discussed that I felt this was viral in nature. Did not see any evidence of a " bacterial process. Discussed natural progression and sx care.    PT can continue OTC medications, increase fluids and rest until symptoms improve.     PT should follow up with PCP in 1-2 days for re-evaluation if symptoms have not improved.  Discussed red flags and reasons to return to UC or ED.  Pt and/or family verbalized understanding of diagnosis and follow up instructions and was offered informational handout on diagnosis.  PT discharged.

## 2018-07-11 ENCOUNTER — HOSPITAL ENCOUNTER (OUTPATIENT)
Dept: RADIOLOGY | Facility: MEDICAL CENTER | Age: 23
End: 2018-07-11
Attending: FAMILY MEDICINE
Payer: COMMERCIAL

## 2018-07-11 ENCOUNTER — OFFICE VISIT (OUTPATIENT)
Dept: URGENT CARE | Facility: PHYSICIAN GROUP | Age: 23
End: 2018-07-11
Payer: COMMERCIAL

## 2018-07-11 VITALS
RESPIRATION RATE: 16 BRPM | DIASTOLIC BLOOD PRESSURE: 78 MMHG | HEIGHT: 64 IN | SYSTOLIC BLOOD PRESSURE: 116 MMHG | TEMPERATURE: 99 F | HEART RATE: 105 BPM | OXYGEN SATURATION: 94 % | BODY MASS INDEX: 24.59 KG/M2 | WEIGHT: 144 LBS

## 2018-07-11 DIAGNOSIS — R05.9 COUGH: ICD-10-CM

## 2018-07-11 DIAGNOSIS — R50.9 FEVER, UNSPECIFIED FEVER CAUSE: ICD-10-CM

## 2018-07-11 DIAGNOSIS — J18.9 PNEUMONIA OF LEFT LOWER LOBE DUE TO INFECTIOUS ORGANISM: ICD-10-CM

## 2018-07-11 PROCEDURE — 71046 X-RAY EXAM CHEST 2 VIEWS: CPT

## 2018-07-11 PROCEDURE — 99213 OFFICE O/P EST LOW 20 MIN: CPT | Performed by: FAMILY MEDICINE

## 2018-07-11 RX ORDER — PROMETHAZINE HYDROCHLORIDE AND CODEINE PHOSPHATE 6.25; 1 MG/5ML; MG/5ML
5 SYRUP ORAL 4 TIMES DAILY PRN
Qty: 120 ML | Refills: 0 | Status: SHIPPED | OUTPATIENT
Start: 2018-07-11 | End: 2018-07-18

## 2018-07-11 RX ORDER — DOXYCYCLINE HYCLATE 100 MG
100 TABLET ORAL 2 TIMES DAILY
Qty: 20 TAB | Refills: 0 | Status: SHIPPED | OUTPATIENT
Start: 2018-07-11 | End: 2018-07-21

## 2018-07-11 ASSESSMENT — ENCOUNTER SYMPTOMS
FOCAL WEAKNESS: 0
HEADACHES: 1
EYE DISCHARGE: 0
SENSORY CHANGE: 0
MYALGIAS: 1
EYE REDNESS: 0
WEIGHT LOSS: 0

## 2018-07-11 NOTE — LETTER
July 11, 2018         Patient: Brijesh Santana   YOB: 1995   Date of Visit: 7/11/2018           To Whom it May Concern:    Brijesh Santana was seen in my clinic on 7/11/2018. Please excuse 7/11 through 7/14/2018 due to pneumonia.     Sincerely,           Rafi Franklin M.D.  Electronically Signed

## 2018-07-11 NOTE — PROGRESS NOTES
"Subjective:      Brijesh Santana is a 23 y.o. male who presents with Cough (cough, congestion x1 week; seen 3 days ago for same, no improvement)            1 week productive cough without blood in sputum. Fever, 101.7 today. +chills. +SOB/wheeze. No PMH asthma/pneumonia. Seen 3d ago with negative xray. No relief with albuterol and oral steroid. No other aggravating or alleviating factors.            Review of Systems   Constitutional: Negative for malaise/fatigue and weight loss.   HENT: Negative for congestion, ear discharge and ear pain.    Eyes: Negative for discharge and redness.   Cardiovascular: Negative for leg swelling.   Musculoskeletal: Positive for myalgias. Negative for joint pain.   Skin: Negative for rash.   Neurological: Positive for headaches. Negative for sensory change and focal weakness.     .  Medications, Allergies, and current problem list reviewed today in Epic       Objective:     /78   Pulse (!) 105   Temp 37.2 °C (99 °F)   Resp 16   Ht 1.626 m (5' 4\")   Wt 65.3 kg (144 lb)   SpO2 94%   BMI 24.72 kg/m²      Physical Exam   Constitutional: He appears well-developed and well-nourished. No distress.   HENT:   Head: Normocephalic.   Right Ear: External ear normal.   Left Ear: External ear normal.   Eyes: Conjunctivae are normal.   Neck: Neck supple. No JVD present.   Cardiovascular: Regular rhythm and normal heart sounds.    rapid   Pulmonary/Chest: Effort normal. He has rales (left base).   Neurological:   Speech is clear. Patient is appropriate and cooperative.     Skin: Skin is warm and dry. No rash noted.               Assessment/Plan:   Xray: LLL pneumonia per radiology    1. Cough  DX-CHEST-2 VIEWS    promethazine-codeine (PHENERGAN-CODEINE) 6.25-10 MG/5ML Syrup   2. Fever, unspecified fever cause  DX-CHEST-2 VIEWS   3. Pneumonia of left lower lobe due to infectious organism (HCC)  doxycycline (VIBRAMYCIN) 100 MG Tab     Differential diagnosis, natural history, " supportive care, and indications for immediate follow-up discussed at length.

## 2018-11-28 ENCOUNTER — EMPLOYEE HEALTH (OUTPATIENT)
Dept: OCCUPATIONAL MEDICINE | Facility: CLINIC | Age: 23
End: 2018-11-28

## 2018-11-28 ENCOUNTER — HOSPITAL ENCOUNTER (OUTPATIENT)
Facility: MEDICAL CENTER | Age: 23
End: 2018-11-28
Attending: PREVENTIVE MEDICINE
Payer: COMMERCIAL

## 2018-11-28 ENCOUNTER — EH NON-PROVIDER (OUTPATIENT)
Dept: OCCUPATIONAL MEDICINE | Facility: CLINIC | Age: 23
End: 2018-11-28

## 2018-11-28 VITALS
HEIGHT: 64 IN | WEIGHT: 148 LBS | OXYGEN SATURATION: 97 % | BODY MASS INDEX: 25.27 KG/M2 | SYSTOLIC BLOOD PRESSURE: 110 MMHG | TEMPERATURE: 98.6 F | RESPIRATION RATE: 16 BRPM | DIASTOLIC BLOOD PRESSURE: 66 MMHG | HEART RATE: 63 BPM

## 2018-11-28 VITALS
SYSTOLIC BLOOD PRESSURE: 110 MMHG | BODY MASS INDEX: 25.27 KG/M2 | OXYGEN SATURATION: 97 % | RESPIRATION RATE: 16 BRPM | DIASTOLIC BLOOD PRESSURE: 66 MMHG | HEIGHT: 64 IN | WEIGHT: 148 LBS | HEART RATE: 63 BPM | TEMPERATURE: 97.9 F

## 2018-11-28 DIAGNOSIS — Z02.1 PRE-EMPLOYMENT HEALTH SCREENING EXAMINATION: ICD-10-CM

## 2018-11-28 DIAGNOSIS — Z02.1 PRE-EMPLOYMENT DRUG SCREENING: ICD-10-CM

## 2018-11-28 LAB
AMP AMPHETAMINE: NORMAL
BAR BARBITURATES: NORMAL
BZO BENZODIAZEPINES: NORMAL
COC COCAINE: NORMAL
INT CON NEG: NORMAL
INT CON POS: NORMAL
MDMA ECSTASY: NORMAL
MET METHAMPHETAMINES: NORMAL
MTD METHADONE: NORMAL
OPI OPIATES: NORMAL
OXY OXYCODONE: NORMAL
PCP PHENCYCLIDINE: NORMAL
POC URINE DRUG SCREEN OCDRS: NEGATIVE
THC: NORMAL

## 2018-11-28 PROCEDURE — 8915 PR COMPREHENSIVE PHYSICAL: Performed by: PREVENTIVE MEDICINE

## 2018-11-28 PROCEDURE — 80305 DRUG TEST PRSMV DIR OPT OBS: CPT | Performed by: PREVENTIVE MEDICINE

## 2018-11-28 PROCEDURE — 86480 TB TEST CELL IMMUN MEASURE: CPT | Performed by: PREVENTIVE MEDICINE

## 2018-11-28 PROCEDURE — 90686 IIV4 VACC NO PRSV 0.5 ML IM: CPT | Performed by: PREVENTIVE MEDICINE

## 2018-11-30 LAB
M TB TUBERC IFN-G BLD QL: NEGATIVE
M TB TUBERC IFN-G/MITOGEN IGNF BLD: 0
M TB TUBERC IGNF/MITOGEN IGNF CONTROL: 51.57 [IU]/ML
MITOGEN IGNF BCKGRD COR BLD-ACNC: 0.01 [IU]/ML

## 2018-12-07 ENCOUNTER — EH NON-PROVIDER (OUTPATIENT)
Dept: OCCUPATIONAL MEDICINE | Facility: CLINIC | Age: 23
End: 2018-12-07

## 2018-12-07 DIAGNOSIS — Z71.85 IMMUNIZATION COUNSELING: ICD-10-CM

## 2019-05-05 ENCOUNTER — OFFICE VISIT (OUTPATIENT)
Dept: URGENT CARE | Facility: PHYSICIAN GROUP | Age: 24
End: 2019-05-05
Payer: COMMERCIAL

## 2019-05-05 ENCOUNTER — HOSPITAL ENCOUNTER (OUTPATIENT)
Facility: MEDICAL CENTER | Age: 24
End: 2019-05-05
Attending: PHYSICIAN ASSISTANT
Payer: COMMERCIAL

## 2019-05-05 VITALS
RESPIRATION RATE: 16 BRPM | BODY MASS INDEX: 25.1 KG/M2 | TEMPERATURE: 97.2 F | HEIGHT: 64 IN | DIASTOLIC BLOOD PRESSURE: 62 MMHG | OXYGEN SATURATION: 98 % | WEIGHT: 147 LBS | HEART RATE: 82 BPM | SYSTOLIC BLOOD PRESSURE: 128 MMHG

## 2019-05-05 DIAGNOSIS — J02.9 PHARYNGITIS, UNSPECIFIED ETIOLOGY: ICD-10-CM

## 2019-05-05 LAB
INT CON NEG: NEGATIVE
INT CON POS: POSITIVE
S PYO AG THROAT QL: NEGATIVE

## 2019-05-05 PROCEDURE — 87070 CULTURE OTHR SPECIMN AEROBIC: CPT

## 2019-05-05 PROCEDURE — 87880 STREP A ASSAY W/OPTIC: CPT | Performed by: PHYSICIAN ASSISTANT

## 2019-05-05 PROCEDURE — 99214 OFFICE O/P EST MOD 30 MIN: CPT | Performed by: PHYSICIAN ASSISTANT

## 2019-05-05 ASSESSMENT — ENCOUNTER SYMPTOMS
CONSTIPATION: 0
EYE DISCHARGE: 0
NAUSEA: 0
TROUBLE SWALLOWING: 1
DIARRHEA: 0
SHORTNESS OF BREATH: 0
HEADACHES: 0
EYE REDNESS: 0
EYE PAIN: 0
CHILLS: 0
SWOLLEN GLANDS: 1
SORE THROAT: 1
VOMITING: 0
ABDOMINAL PAIN: 0
STRIDOR: 0
FEVER: 0
COUGH: 0
NECK PAIN: 0
HOARSE VOICE: 0

## 2019-05-05 NOTE — PROGRESS NOTES
Subjective:   Brijesh Santana is a 23 y.o. male who presents for Pharyngitis (Sore throat, pus pockets on tonsils x1week )       Pharyngitis    This is a new problem. Episode onset: 1 week ago. The problem has been unchanged. Neither side of throat is experiencing more pain than the other. There has been no fever. The pain is moderate. Associated symptoms include swollen glands and trouble swallowing. Pertinent negatives include no abdominal pain, congestion, coughing, diarrhea, drooling, ear pain, headaches, hoarse voice, plugged ear sensation, neck pain, shortness of breath, stridor or vomiting. Treatments tried: Dayquil, Nyquil. The treatment provided mild relief.     Review of Systems   Constitutional: Negative for chills and fever.   HENT: Positive for sore throat and trouble swallowing. Negative for congestion, drooling, ear pain and hoarse voice.    Eyes: Negative for pain, discharge and redness.   Respiratory: Negative for cough, shortness of breath and stridor.    Cardiovascular: Negative for chest pain.   Gastrointestinal: Negative for abdominal pain, constipation, diarrhea, nausea and vomiting.   Musculoskeletal: Negative for neck pain.   Neurological: Negative for headaches.   All other systems reviewed and are negative.      PMH:  has a past medical history of Skin lesion of hand (2/2012).    MEDS:   Current Outpatient Prescriptions:   •  albuterol 108 (90 Base) MCG/ACT Aero Soln inhalation aerosol, Inhale 2 Puffs by mouth every four hours as needed. (Patient not taking: Reported on 5/5/2019), Disp: 1 Inhaler, Rfl: 0  •  cetirizine (ZYRTEC) 10 MG Tab, Take 10 mg by mouth every day., Disp: , Rfl:   •  ibuprofen (MOTRIN) 800 MG Tab, Take 1 Tab by mouth every 8 hours as needed. (Patient not taking: Reported on 5/5/2019), Disp: 30 Tab, Rfl: 1  •  DPH-Lido-AlHydr-MgHydr-Simeth (FIRST-MOUTHWASH BLM) Suspension, Take 10 mL by mouth every 6 hours as needed for up to 14 doses. (Patient not taking:  "Reported on 5/5/2019), Disp: 140 mL, Rfl: 0  •  ketoconazole (NIZORAL) 2 % Cream, Apply to affected area daily (Patient not taking: Reported on 6/26/2016), Disp: 30 g, Rfl: 0    ALLERGIES: No Known Allergies    SURGHX: History reviewed. No pertinent surgical history.    SOCHX:  reports that he has never smoked. He has never used smokeless tobacco. He reports that he does not drink alcohol or use drugs.    FH: Reviewed with patient, not pertinent to this visit.     Objective:   /62   Pulse 82   Temp 36.2 °C (97.2 °F) (Temporal)   Resp 16   Ht 1.626 m (5' 4\")   Wt 66.7 kg (147 lb)   SpO2 98%   BMI 25.23 kg/m²   Physical Exam   Constitutional: He is oriented to person, place, and time. He appears well-developed and well-nourished. No distress.   HENT:   Head: Normocephalic and atraumatic.   Right Ear: Tympanic membrane, external ear and ear canal normal.   Left Ear: Tympanic membrane, external ear and ear canal normal.   Nose: Nose normal.   Mouth/Throat: Uvula is midline and mucous membranes are normal. Posterior oropharyngeal edema and posterior oropharyngeal erythema present. No oropharyngeal exudate. Tonsils are 2+ on the right. Tonsils are 2+ on the left.   Eyes: Pupils are equal, round, and reactive to light. Conjunctivae and EOM are normal.   Neck: Normal range of motion. Neck supple. No tracheal deviation present.   Cardiovascular: Normal rate, regular rhythm and normal heart sounds.    Pulmonary/Chest: Effort normal and breath sounds normal. No respiratory distress. He has no wheezes. He has no rhonchi. He has no rales.   Musculoskeletal:   ROM normal all four extremities   Lymphadenopathy:     He has cervical adenopathy.   Neurological: He is alert and oriented to person, place, and time.   Skin: Skin is warm and dry.   Psychiatric: He has a normal mood and affect. His behavior is normal. Judgment and thought content normal.   Vitals reviewed.    - POCT Rapid Strep A: negative    Assessment/Plan: "   1. Pharyngitis, unspecified etiology  - POCT Rapid Strep A  - CULTURE THROAT; Future    - Advised to try warm fluids, saline gargles prn  - Advised to continue OTC ibuprofen/acetaminophen prn  - Will call patient with culture results if treatment change is indicated  - Advised to return if symptoms worsen or do not improve    Differential diagnosis, natural history, supportive care, and indications for immediate follow-up discussed.

## 2019-05-08 LAB
BACTERIA SPEC RESP CULT: NORMAL
SIGNIFICANT IND 70042: NORMAL
SITE SITE: NORMAL
SOURCE SOURCE: NORMAL

## 2020-03-15 ENCOUNTER — OFFICE VISIT (OUTPATIENT)
Dept: URGENT CARE | Facility: PHYSICIAN GROUP | Age: 25
End: 2020-03-15
Payer: COMMERCIAL

## 2020-03-15 VITALS
HEIGHT: 64 IN | TEMPERATURE: 98.1 F | RESPIRATION RATE: 16 BRPM | DIASTOLIC BLOOD PRESSURE: 76 MMHG | BODY MASS INDEX: 26.4 KG/M2 | SYSTOLIC BLOOD PRESSURE: 104 MMHG | WEIGHT: 154.6 LBS | HEART RATE: 80 BPM | OXYGEN SATURATION: 98 %

## 2020-03-15 DIAGNOSIS — M94.0 ACUTE COSTOCHONDRITIS: ICD-10-CM

## 2020-03-15 PROCEDURE — 99214 OFFICE O/P EST MOD 30 MIN: CPT | Performed by: PHYSICIAN ASSISTANT

## 2020-03-15 RX ORDER — METHYLPREDNISOLONE 4 MG/1
TABLET ORAL
Qty: 21 TAB | Refills: 0 | Status: SHIPPED | OUTPATIENT
Start: 2020-03-15 | End: 2021-08-02

## 2020-03-15 ASSESSMENT — ENCOUNTER SYMPTOMS
ANOREXIA: 0
CONSTIPATION: 0
DIARRHEA: 0
HEARTBURN: 0
NAUSEA: 0
ABDOMINAL PAIN: 0
FEVER: 0
BLOOD IN STOOL: 0
VOMITING: 0
PALPITATIONS: 0
CHILLS: 0

## 2020-03-15 NOTE — LETTER
March 15, 2020         Patient: Brijesh Santana   YOB: 1995   Date of Visit: 3/15/2020           To Whom it May Concern:    Brijesh Santana was seen in my clinic on 3/15/2020. He can return to work 3/19/20. Please excuse his recent absence.     If you have any questions or concerns, please don't hesitate to call.        Sincerely,           Madi Jean P.A.-C.  Electronically Signed

## 2020-03-15 NOTE — PROGRESS NOTES
Subjective:   Brijesh Santana is a 24 y.o. male who presents today with   Chief Complaint   Patient presents with   • Rib Pain     right side pain,stabbing feeling,x 4 days       Rib Injury   This is a new problem. Episode onset: 4 days. The problem occurs intermittently. The problem has been unchanged. Pertinent negatives include no abdominal pain, anorexia, chest pain, chills, fever, nausea or vomiting. The symptoms are aggravated by twisting (position, palpation). He has tried rest and NSAIDs for the symptoms. The treatment provided mild relief.     Patient states he noticed some bilateral rib discomfort after he and his wife had sex earlier this week.  He states that he has noticed the pain throughout most activity this week.  PMH:  has a past medical history of Skin lesion of hand (2/2012).  MEDS:   Current Outpatient Medications:   •  methylPREDNISolone (MEDROL DOSEPAK) 4 MG Tablet Therapy Pack, Follow schedule on package instructions., Disp: 21 Tab, Rfl: 0  •  albuterol 108 (90 Base) MCG/ACT Aero Soln inhalation aerosol, Inhale 2 Puffs by mouth every four hours as needed. (Patient not taking: Reported on 5/5/2019), Disp: 1 Inhaler, Rfl: 0  •  cetirizine (ZYRTEC) 10 MG Tab, Take 10 mg by mouth every day., Disp: , Rfl:   •  ibuprofen (MOTRIN) 800 MG Tab, Take 1 Tab by mouth every 8 hours as needed. (Patient not taking: Reported on 5/5/2019), Disp: 30 Tab, Rfl: 1  •  DPH-Lido-AlHydr-MgHydr-Simeth (FIRST-MOUTHWASH BLM) Suspension, Take 10 mL by mouth every 6 hours as needed for up to 14 doses. (Patient not taking: Reported on 5/5/2019), Disp: 140 mL, Rfl: 0  •  ketoconazole (NIZORAL) 2 % Cream, Apply to affected area daily (Patient not taking: Reported on 6/26/2016), Disp: 30 g, Rfl: 0  ALLERGIES: No Known Allergies  SURGHX: No past surgical history on file.  SOCHX:  reports that he has never smoked. He has never used smokeless tobacco. He reports that he does not drink alcohol or use drugs.  FH:  "Reviewed with patient, not pertinent to this visit.       Review of Systems   Constitutional: Negative for chills and fever.   Cardiovascular: Negative for chest pain and palpitations.   Gastrointestinal: Negative for abdominal pain, anorexia, blood in stool, constipation, diarrhea, heartburn, melena, nausea and vomiting.   Musculoskeletal:        Right chest wall,rib pain   All other systems reviewed and are negative.       Objective:   /76 (BP Location: Right arm, Patient Position: Sitting, BP Cuff Size: Adult)   Pulse 80   Temp 36.7 °C (98.1 °F) (Temporal)   Resp 16   Ht 1.626 m (5' 4\")   Wt 70.1 kg (154 lb 9.6 oz)   SpO2 98%   BMI 26.54 kg/m²   Physical Exam  Vitals signs and nursing note reviewed.   Constitutional:       General: He is not in acute distress.     Appearance: He is well-developed.   HENT:      Head: Normocephalic and atraumatic.      Right Ear: Hearing normal.      Left Ear: Hearing normal.   Eyes:      Pupils: Pupils are equal, round, and reactive to light.   Cardiovascular:      Rate and Rhythm: Normal rate and regular rhythm.      Heart sounds: Normal heart sounds.   Pulmonary:      Effort: Pulmonary effort is normal.   Chest:      Chest wall: Tenderness present.          Comments: Chest wall tenderness to the right anterior portion of the rib cage  Abdominal:      General: Bowel sounds are normal.      Palpations: Abdomen is soft. There is no hepatomegaly.      Tenderness: There is no abdominal tenderness. There is no right CVA tenderness, left CVA tenderness, guarding or rebound. Negative signs include Milligan's sign and McBurney's sign.      Hernia: No hernia is present.   Musculoskeletal:      Comments: Normal movement in all 4 extremities   Skin:     General: Skin is warm and dry.   Neurological:      Mental Status: He is alert.      Coordination: Coordination normal.   Psychiatric:         Mood and Affect: Mood normal.     Patient also grimaces in pain with certain movement " and twisting.  Assessment/Plan:   Assessment    1. Acute costochondritis  - methylPREDNISolone (MEDROL DOSEPAK) 4 MG Tablet Therapy Pack; Follow schedule on package instructions.  Dispense: 21 Tab; Refill: 0  Most consistent with chest wall muscle strain or costochondritis.  Discussed lots of rest and avoiding any strenuous lifting or exercise for the next couple of days.  Ice the area along with rest.  Differential diagnosis, natural history, supportive care, and indications for immediate follow-up discussed.   Patient given instructions and understanding of medications and treatment.    If not improving in 3-5 days, F/U with PCP or return to  if symptoms worsen.    Patient agreeable to plan.      Please note that this dictation was created using voice recognition software. I have made every reasonable attempt to correct obvious errors, but I expect that there are errors of grammar and possibly content that I did not discover before finalizing the note.    Madi Jean PA-C

## 2021-06-25 ENCOUNTER — HOSPITAL ENCOUNTER (OUTPATIENT)
Facility: MEDICAL CENTER | Age: 26
End: 2021-06-25
Attending: PHYSICIAN ASSISTANT
Payer: COMMERCIAL

## 2021-06-25 ENCOUNTER — OFFICE VISIT (OUTPATIENT)
Dept: URGENT CARE | Facility: PHYSICIAN GROUP | Age: 26
End: 2021-06-25
Payer: COMMERCIAL

## 2021-06-25 VITALS
DIASTOLIC BLOOD PRESSURE: 72 MMHG | TEMPERATURE: 97.6 F | RESPIRATION RATE: 18 BRPM | WEIGHT: 155 LBS | HEIGHT: 64 IN | BODY MASS INDEX: 26.46 KG/M2 | HEART RATE: 74 BPM | SYSTOLIC BLOOD PRESSURE: 118 MMHG | OXYGEN SATURATION: 96 %

## 2021-06-25 DIAGNOSIS — J02.9 PHARYNGITIS, UNSPECIFIED ETIOLOGY: ICD-10-CM

## 2021-06-25 DIAGNOSIS — J00 ACUTE RHINITIS: ICD-10-CM

## 2021-06-25 LAB
COVID ORDER STATUS COVID19: NORMAL
INT CON NEG: NORMAL
INT CON POS: NORMAL
S PYO AG THROAT QL: NEGATIVE

## 2021-06-25 PROCEDURE — U0005 INFEC AGEN DETEC AMPLI PROBE: HCPCS

## 2021-06-25 PROCEDURE — 87880 STREP A ASSAY W/OPTIC: CPT | Performed by: PHYSICIAN ASSISTANT

## 2021-06-25 PROCEDURE — U0003 INFECTIOUS AGENT DETECTION BY NUCLEIC ACID (DNA OR RNA); SEVERE ACUTE RESPIRATORY SYNDROME CORONAVIRUS 2 (SARS-COV-2) (CORONAVIRUS DISEASE [COVID-19]), AMPLIFIED PROBE TECHNIQUE, MAKING USE OF HIGH THROUGHPUT TECHNOLOGIES AS DESCRIBED BY CMS-2020-01-R: HCPCS

## 2021-06-25 PROCEDURE — 99213 OFFICE O/P EST LOW 20 MIN: CPT | Performed by: PHYSICIAN ASSISTANT

## 2021-06-25 ASSESSMENT — ENCOUNTER SYMPTOMS
HEADACHES: 0
EYE PAIN: 0
DIARRHEA: 0
SORE THROAT: 1
VOMITING: 0
SHORTNESS OF BREATH: 0
ABDOMINAL PAIN: 0
COUGH: 0
NAUSEA: 0
FEVER: 0
CHILLS: 0
MYALGIAS: 0
CONSTIPATION: 0

## 2021-06-25 NOTE — PROGRESS NOTES
Subjective:   Brijesh Santana is a 25 y.o. male who presents for Sore Throat (nasal congestion, x3-4 days. )      HPI:  This is a pleasant 25-year-old male presents to urgent care complaining of runny nose, nasal congestion, sore throat, generalized malaise that is been ongoing for 3 to 4 days.  His symptom onset is predated by his daughter who is attending summer camp and came down with the viral upper respiratory syndrome in days preceding onset of his illness.  He has not noticed any fevers or chills, body aches, shortness of breath, cough or gastrointestinal symptoms.  He is dual vaccinated against COVID-19, second vaccination was in February 2021    Review of Systems   Constitutional: Positive for malaise/fatigue. Negative for chills and fever.   HENT: Positive for congestion and sore throat. Negative for ear pain and nosebleeds.    Eyes: Negative for pain.   Respiratory: Negative for cough and shortness of breath.    Cardiovascular: Negative for chest pain.   Gastrointestinal: Negative for abdominal pain, constipation, diarrhea, nausea and vomiting.   Genitourinary: Negative for dysuria.   Musculoskeletal: Negative for myalgias.   Skin: Negative for rash.   Neurological: Negative for headaches.       Medications:    • albuterol Aers  • cetirizine Tabs  • ibuprofen Tabs  • ketoconazole Crea  • MAGIC MOUTHWASH BLM Susp  • methylPREDNISolone Tbpk    Allergies: Patient has no known allergies.    Problem List: Brijesh Santana does not have any pertinent problems on file.    Surgical History:  No past surgical history on file.    Past Social Hx: Brijesh Santana  reports that he has never smoked. He has never used smokeless tobacco. He reports that he does not drink alcohol and does not use drugs.     Past Family Hx:  Brijesh Santana family history includes Diabetes in his maternal grandmother; Hypertension in his maternal grandmother; Other in his mother.     Problem list,  "medications, and allergies reviewed by myself today in Epic.     Objective:     /72 (BP Location: Left arm, Patient Position: Sitting, BP Cuff Size: Adult)   Pulse 74   Temp 36.4 °C (97.6 °F) (Temporal)   Resp 18   Ht 1.626 m (5' 4\")   Wt 70.3 kg (155 lb)   SpO2 96%   BMI 26.61 kg/m²     Physical Exam  Vitals reviewed.   Constitutional:       Appearance: Normal appearance.   HENT:      Head: Normocephalic and atraumatic.      Right Ear: Tympanic membrane, ear canal and external ear normal.      Left Ear: Tympanic membrane, ear canal and external ear normal.      Nose: Congestion and rhinorrhea present.      Comments: Erythematous nasal turbinates     Mouth/Throat:      Mouth: Mucous membranes are moist.      Pharynx: No oropharyngeal exudate or posterior oropharyngeal erythema.   Eyes:      Conjunctiva/sclera: Conjunctivae normal.      Pupils: Pupils are equal, round, and reactive to light.   Cardiovascular:      Rate and Rhythm: Normal rate and regular rhythm.   Pulmonary:      Effort: Pulmonary effort is normal.      Breath sounds: Normal breath sounds.   Lymphadenopathy:      Cervical: Cervical adenopathy (Posterior) present.   Skin:     General: Skin is warm and dry.      Capillary Refill: Capillary refill takes less than 2 seconds.   Neurological:      Mental Status: He is alert and oriented to person, place, and time.         Assessment/Plan:     Diagnosis and associated orders:     1. Pharyngitis, unspecified etiology  COVID/SARS CoV-2 PCR    POCT Rapid Strep A   2. Acute rhinitis  COVID/SARS CoV-2 PCR      Comments/MDM:     • Rapid strep negative  • History and physical support the diagnosis of acute uncomplicated viral upper respiratory tract infection.  No evidence of bacterial etiology at this time.  • Out of an abundance of caution and over the concern of the delta variant I will test for Covid, recommended self-isolation until his results are available  • Patient will be informed via " MyChart  • Discussed supportive care  • Discussed pathophysiology, expectant management, the typical timeframe for healing  • Follow-up as needed         Differential diagnosis, natural history, supportive care, and indications for immediate follow-up discussed.    Advised the patient to follow-up with the primary care physician for recheck, reevaluation, and consideration of further management.    Please note that this dictation was created using voice recognition software. I have made a reasonable attempt to correct obvious errors, but I expect that there are errors of grammar and possibly content that I did not discover before finalizing the note.    This note was electronically signed by Octavio Welsh PA-C

## 2021-06-26 LAB
SARS-COV-2 RNA RESP QL NAA+PROBE: NOTDETECTED
SPECIMEN SOURCE: NORMAL

## 2021-08-02 ENCOUNTER — HOSPITAL ENCOUNTER (OUTPATIENT)
Facility: MEDICAL CENTER | Age: 26
End: 2021-08-02
Attending: PHYSICIAN ASSISTANT
Payer: COMMERCIAL

## 2021-08-02 ENCOUNTER — OFFICE VISIT (OUTPATIENT)
Dept: URGENT CARE | Facility: PHYSICIAN GROUP | Age: 26
End: 2021-08-02
Payer: COMMERCIAL

## 2021-08-02 VITALS
BODY MASS INDEX: 27.31 KG/M2 | RESPIRATION RATE: 18 BRPM | OXYGEN SATURATION: 96 % | SYSTOLIC BLOOD PRESSURE: 120 MMHG | DIASTOLIC BLOOD PRESSURE: 68 MMHG | HEIGHT: 64 IN | WEIGHT: 160 LBS | HEART RATE: 120 BPM | TEMPERATURE: 100.5 F

## 2021-08-02 DIAGNOSIS — J02.9 PHARYNGITIS, UNSPECIFIED ETIOLOGY: ICD-10-CM

## 2021-08-02 DIAGNOSIS — J06.9 VIRAL URI: ICD-10-CM

## 2021-08-02 DIAGNOSIS — R50.9 FEVER, UNSPECIFIED FEVER CAUSE: ICD-10-CM

## 2021-08-02 PROCEDURE — 99203 OFFICE O/P NEW LOW 30 MIN: CPT | Performed by: PHYSICIAN ASSISTANT

## 2021-08-02 PROCEDURE — U0005 INFEC AGEN DETEC AMPLI PROBE: HCPCS

## 2021-08-02 PROCEDURE — U0003 INFECTIOUS AGENT DETECTION BY NUCLEIC ACID (DNA OR RNA); SEVERE ACUTE RESPIRATORY SYNDROME CORONAVIRUS 2 (SARS-COV-2) (CORONAVIRUS DISEASE [COVID-19]), AMPLIFIED PROBE TECHNIQUE, MAKING USE OF HIGH THROUGHPUT TECHNOLOGIES AS DESCRIBED BY CMS-2020-01-R: HCPCS

## 2021-08-02 ASSESSMENT — ENCOUNTER SYMPTOMS
SPUTUM PRODUCTION: 0
CHILLS: 0
NAUSEA: 0
SHORTNESS OF BREATH: 0
FEVER: 1
VOMITING: 0
HEADACHES: 1
SORE THROAT: 1
COUGH: 0
ABDOMINAL PAIN: 0
DIARRHEA: 0
MYALGIAS: 1
WHEEZING: 0

## 2021-08-02 NOTE — LETTER
August 2, 2021       Patient: Brijesh Santana   YOB: 1995   Date of Visit: 8/2/2021         To Whom it May Concern,   Your employee/student was seen in our clinic today. A concern for COVID-19 has been identified and testing is in progress.?   ?  We are asking you to excuse absences while following self-isolation protocol per Center for Disease Control (CDC) guidelines. Your employee/student will be able to access test results through our electronic delivery system called [employer].?   ?  If the results of testing are negative, and once there has been no fever (temperature >100.4 F) for at least 72 hours without treatment, and no vomiting or diarrhea for at least 48 hours, then return to work/school is approved.   ?  If the results of testing are positive then your employee/student will be contacted by the [facility] or [employer] [employer] to [facility] department for further instructions on duration of self-isolation and return to work/school protocol. In general, this will also follow the CDC guidelines with a minimum of 10 days from the onset of symptoms and without fever, vomiting, or diarrhea as above.?   ?  In general, repeat testing is not necessary and not offered through our [facility].?   ?  This is the only note that will be provided from [facility] for this visit. Your employee/student will require an appointment with a primary care provider if FMLA or disability forms are required.   ?  Sincerely,?         Wes Conklin P.A.-C.  Electronically Signed

## 2021-08-02 NOTE — PROGRESS NOTES
"Subjective:   Brijesh Santana  is a 26 y.o. male who presents for Fever (fever, chills, bodyaches, headache, sore throat x3 days)      Fever   Associated symptoms include congestion, headaches and a sore throat. Pertinent negatives include no abdominal pain, coughing, diarrhea, ear pain, nausea, rash, vomiting or wheezing.   Patient presents urgent care complaining of last 3 days of intermittent sinus congestion, sore throat, body aches some headache.  Noted max temp of just over 100 degrees with a fever as well.  Denies ear pain.  Notes his 10-month-old just began a  And has been bringing home illnesses more frequently recently.  Denies nausea vomiting abdominal pain diarrhea or rash.  Notes remote history of bronchitis pneumonia.  Complains of mild sinus congestion but denies much chest congestion.  Was treated with MDI with lower respiratory infection in the past but denies any wheezing or coughing currently.  Denies loss of taste or smell.  Patient is vaccinated against COVID-19.    Review of Systems   Constitutional: Positive for fever. Negative for chills.   HENT: Positive for congestion and sore throat. Negative for ear pain.    Respiratory: Negative for cough, sputum production, shortness of breath and wheezing.    Gastrointestinal: Negative for abdominal pain, diarrhea, nausea and vomiting.   Musculoskeletal: Positive for myalgias.   Skin: Negative for rash.   Neurological: Positive for headaches.       No Known Allergies     Objective:   /68 (BP Location: Left arm, Patient Position: Sitting, BP Cuff Size: Small adult)   Pulse (!) 120   Temp (!) 38.1 °C (100.5 °F) (Temporal)   Resp 18   Ht 1.626 m (5' 4\")   Wt 72.6 kg (160 lb)   SpO2 96%   BMI 27.46 kg/m²     Physical Exam  Vitals and nursing note reviewed.   Constitutional:       General: He is not in acute distress.     Appearance: He is well-developed. He is not diaphoretic.   HENT:      Head: Normocephalic and atraumatic. "      Right Ear: Tympanic membrane, ear canal and external ear normal.      Left Ear: Tympanic membrane, ear canal and external ear normal.      Nose: Nose normal.      Mouth/Throat:      Pharynx: Uvula midline. Posterior oropharyngeal erythema ( mild PND) present. No oropharyngeal exudate.      Tonsils: No tonsillar abscesses.   Eyes:      General: No scleral icterus.        Right eye: No discharge.         Left eye: No discharge.      Conjunctiva/sclera: Conjunctivae normal.   Pulmonary:      Effort: Pulmonary effort is normal. No respiratory distress.      Breath sounds: No decreased breath sounds, wheezing, rhonchi or rales.   Musculoskeletal:         General: Normal range of motion.      Cervical back: Neck supple.   Lymphadenopathy:      Cervical: Cervical adenopathy ( mild bilat) present.   Skin:     General: Skin is warm and dry.      Coloration: Skin is not pale.   Neurological:      Mental Status: He is alert and oriented to person, place, and time.      Coordination: Coordination normal.       Point-of-care strep for strep is negative  Covid pending    Assessment/Plan:   1. Viral URI    2. Pharyngitis, unspecified etiology  - POCT Rapid Strep A    3. Fever, unspecified fever cause  - COVID/SARS CoV-2 PCR; Future  Supportive care is reviewed with patient/caregiver - recommend to push PO fluids and electrolytes, over-the-counter symptom support medications reviewed, ER precautions with worsened symptoms, quarantine recommendations reviewed, [place] results of [place]  Return to clinic with lack of resolution or progression of symptoms.  ER precautions with any worsening symptoms are reviewed with patient/caregiver and they do express understanding    I have worn an N95 mask, gloves and eye protection for the entire encounter with this patient.     Differential diagnosis, natural history, supportive care, and indications for immediate follow-up discussed.

## 2021-08-03 DIAGNOSIS — R50.9 FEVER, UNSPECIFIED FEVER CAUSE: ICD-10-CM

## 2021-08-03 LAB
COVID ORDER STATUS COVID19: NORMAL
SARS-COV-2 RNA RESP QL NAA+PROBE: NOTDETECTED
SPECIMEN SOURCE: NORMAL

## 2021-08-06 ENCOUNTER — OFFICE VISIT (OUTPATIENT)
Dept: URGENT CARE | Facility: PHYSICIAN GROUP | Age: 26
End: 2021-08-06
Payer: COMMERCIAL

## 2021-08-06 VITALS
OXYGEN SATURATION: 96 % | SYSTOLIC BLOOD PRESSURE: 120 MMHG | DIASTOLIC BLOOD PRESSURE: 80 MMHG | BODY MASS INDEX: 27.54 KG/M2 | HEIGHT: 64 IN | RESPIRATION RATE: 18 BRPM | TEMPERATURE: 98.3 F | HEART RATE: 72 BPM | WEIGHT: 161.3 LBS

## 2021-08-06 DIAGNOSIS — R05.9 COUGH: ICD-10-CM

## 2021-08-06 PROCEDURE — 99213 OFFICE O/P EST LOW 20 MIN: CPT | Performed by: PHYSICIAN ASSISTANT

## 2021-08-06 NOTE — LETTER
August 6, 2021         Patient: Brijesh Santana   YOB: 1995   Date of Visit: 8/6/2021           To Whom it May Concern:    Brijesh Santana was seen in my clinic on 8/6/2021.  Please excuse him from work.  He may return on 8/9/2021.  If you have any questions or concerns, please don't hesitate to call.        Sincerely,           Tobias Kruse P.A.-C.  Electronically Signed

## 2021-08-07 ASSESSMENT — ENCOUNTER SYMPTOMS
COUGH: 1
HEMOPTYSIS: 0
SHORTNESS OF BREATH: 1
SORE THROAT: 0
PALPITATIONS: 0
FEVER: 0
CHILLS: 0
WHEEZING: 0
SPUTUM PRODUCTION: 0

## 2021-08-07 NOTE — PROGRESS NOTES
"Subjective:   Brijesh Santana is a 26 y.o. male who presents for Shortness of Breath (cough, congestion, x1 week. )      Shortness of Breath  This is a new problem. The current episode started in the past 7 days. The problem has been gradually improving. Pertinent negatives include no chest pain, ear pain, fever, hemoptysis, sore throat, sputum production or wheezing. Nothing aggravates the symptoms. The patient has no known risk factors for DVT/PE. He has tried nothing for the symptoms.       Review of Systems   Constitutional: Positive for malaise/fatigue. Negative for chills and fever.   HENT: Negative for congestion, ear pain and sore throat.    Respiratory: Positive for cough and shortness of breath. Negative for hemoptysis, sputum production and wheezing.    Cardiovascular: Negative for chest pain and palpitations.   All other systems reviewed and are negative.      Medications:    • This patient does not have an active medication from one of the medication groupers.    Allergies: Patient has no known allergies.    Problem List: Brijesh Santana does not have any pertinent problems on file.    Surgical History:  No past surgical history on file.    Past Social Hx: Brijesh Santana  reports that he has never smoked. He has never used smokeless tobacco. He reports that he does not drink alcohol and does not use drugs.     Past Family Hx:  Brijesh Santana family history includes Diabetes in his maternal grandmother; Hypertension in his maternal grandmother; Other in his mother.     Problem list, medications, and allergies reviewed by myself today in Epic.     Objective:     Blood Pressure 120/80   Pulse 72   Temperature 36.8 °C (98.3 °F) (Temporal)   Respiration 18   Height 1.626 m (5' 4\")   Weight 73.2 kg (161 lb 4.8 oz)   Oxygen Saturation 96%   Body Mass Index 27.69 kg/m²     Physical Exam  Vitals reviewed.   Constitutional:       General: He is not in acute " distress.     Appearance: He is well-developed. He is not ill-appearing or toxic-appearing.      Interventions: He is not intubated.  HENT:      Head: Normocephalic and atraumatic.      Right Ear: Hearing, tympanic membrane, ear canal and external ear normal.      Left Ear: Hearing, tympanic membrane, ear canal and external ear normal.      Nose: Nose normal.      Mouth/Throat:      Pharynx: Uvula midline.   Eyes:      General: Lids are normal.      Conjunctiva/sclera: Conjunctivae normal.   Cardiovascular:      Rate and Rhythm: Regular rhythm.      Heart sounds: Normal heart sounds, S1 normal and S2 normal. No murmur heard.   No friction rub. No gallop.    Pulmonary:      Effort: Pulmonary effort is normal. No tachypnea, bradypnea, accessory muscle usage or respiratory distress. He is not intubated.      Breath sounds: Normal breath sounds. No decreased breath sounds, wheezing, rhonchi or rales.   Chest:      Chest wall: No tenderness.   Musculoskeletal:         General: Normal range of motion.      Cervical back: Full passive range of motion without pain, normal range of motion and neck supple.   Skin:     General: Skin is warm and dry.   Neurological:      Mental Status: He is alert and oriented to person, place, and time.   Psychiatric:         Speech: Speech normal.         Behavior: Behavior normal.         Thought Content: Thought content normal.         Judgment: Judgment normal.         Assessment/Plan:     Medical Decision Making/Comments     -seen 8/2 for cough.  Covid neg.  Pt feeling better, needs work note.   Diagnosis and associated orders     1. Cough                Differential diagnosis, natural history, supportive care, and indications for immediate follow-up discussed.    Advised the patient to follow-up with the primary care physician for recheck, reevaluation, and consideration of further management.    Please note that this dictation was created using voice recognition software. I have made a  reasonable attempt to correct obvious errors, but I expect that there are errors of grammar and possibly content that I did not discover before finalizing the note.

## 2021-10-06 ENCOUNTER — HOSPITAL ENCOUNTER (OUTPATIENT)
Facility: MEDICAL CENTER | Age: 26
End: 2021-10-06
Attending: PHYSICIAN ASSISTANT
Payer: COMMERCIAL

## 2021-10-06 ENCOUNTER — OFFICE VISIT (OUTPATIENT)
Dept: URGENT CARE | Facility: PHYSICIAN GROUP | Age: 26
End: 2021-10-06
Payer: COMMERCIAL

## 2021-10-06 VITALS
SYSTOLIC BLOOD PRESSURE: 122 MMHG | DIASTOLIC BLOOD PRESSURE: 72 MMHG | BODY MASS INDEX: 25.61 KG/M2 | RESPIRATION RATE: 18 BRPM | HEIGHT: 64 IN | OXYGEN SATURATION: 99 % | WEIGHT: 150 LBS | TEMPERATURE: 98.3 F | HEART RATE: 88 BPM

## 2021-10-06 DIAGNOSIS — J02.9 PHARYNGITIS, UNSPECIFIED ETIOLOGY: ICD-10-CM

## 2021-10-06 LAB
EXTERNAL QUALITY CONTROL: NORMAL
HETEROPH AB SER QL LA: NEGATIVE
INT CON NEG: NORMAL
INT CON POS: NORMAL
SARS-COV+SARS-COV-2 AG RESP QL IA.RAPID: NEGATIVE

## 2021-10-06 PROCEDURE — 87070 CULTURE OTHR SPECIMN AEROBIC: CPT

## 2021-10-06 PROCEDURE — U0003 INFECTIOUS AGENT DETECTION BY NUCLEIC ACID (DNA OR RNA); SEVERE ACUTE RESPIRATORY SYNDROME CORONAVIRUS 2 (SARS-COV-2) (CORONAVIRUS DISEASE [COVID-19]), AMPLIFIED PROBE TECHNIQUE, MAKING USE OF HIGH THROUGHPUT TECHNOLOGIES AS DESCRIBED BY CMS-2020-01-R: HCPCS

## 2021-10-06 PROCEDURE — U0005 INFEC AGEN DETEC AMPLI PROBE: HCPCS

## 2021-10-06 PROCEDURE — 87426 SARSCOV CORONAVIRUS AG IA: CPT | Performed by: PHYSICIAN ASSISTANT

## 2021-10-06 PROCEDURE — 86308 HETEROPHILE ANTIBODY SCREEN: CPT | Performed by: PHYSICIAN ASSISTANT

## 2021-10-06 PROCEDURE — 99213 OFFICE O/P EST LOW 20 MIN: CPT | Mod: CS | Performed by: PHYSICIAN ASSISTANT

## 2021-10-06 RX ORDER — DEXAMETHASONE SODIUM PHOSPHATE 10 MG/ML
10 INJECTION INTRAMUSCULAR; INTRAVENOUS ONCE
Status: COMPLETED | OUTPATIENT
Start: 2021-10-06 | End: 2021-10-06

## 2021-10-06 RX ADMIN — DEXAMETHASONE SODIUM PHOSPHATE 10 MG: 10 INJECTION INTRAMUSCULAR; INTRAVENOUS at 18:45

## 2021-10-06 ASSESSMENT — ENCOUNTER SYMPTOMS
EYE PAIN: 0
VOMITING: 0
SORE THROAT: 1
SHORTNESS OF BREATH: 0
HEADACHES: 0
FEVER: 0
MYALGIAS: 0
NAUSEA: 0
DIARRHEA: 0
CONSTIPATION: 0
CHILLS: 0
ABDOMINAL PAIN: 0
COUGH: 0

## 2021-10-07 DIAGNOSIS — J02.9 PHARYNGITIS, UNSPECIFIED ETIOLOGY: ICD-10-CM

## 2021-10-07 LAB — COVID ORDER STATUS COVID19: NORMAL

## 2021-10-07 NOTE — PROGRESS NOTES
"Subjective:   Brijesh Santana is a 26 y.o. male who presents for Pharyngitis (x5 days, Pt states sore throat, swollen, discomfort to swallow, ear pain  )      HPI:  26-year-old male with mild sore throat is progressive the last 5 days, mild bilateral ear pain, mild pain with swallowing although tolerating liquids and solids.-Negative strep test done in outside clinic.  Has a 1-year-old and a 3-year-old who have been sick with similar symptoms and also had a negative Covid test.  He is vaccinating its COVID-19.  He denies any cough or difficulty breathing    Review of Systems   Constitutional: Negative for chills and fever.   HENT: Positive for ear pain and sore throat. Negative for congestion.    Eyes: Negative for pain.   Respiratory: Negative for cough and shortness of breath.    Cardiovascular: Negative for chest pain.   Gastrointestinal: Negative for abdominal pain, constipation, diarrhea, nausea and vomiting.   Genitourinary: Negative for dysuria.   Musculoskeletal: Negative for myalgias.   Skin: Negative for rash.   Neurological: Negative for headaches.       Medications:    • This patient does not have an active medication from one of the medication groupers.    Allergies: Patient has no known allergies.    Problem List: Brijesh Santana does not have any pertinent problems on file.    Surgical History:  No past surgical history on file.    Past Social Hx: Brijesh Santana  reports that he has never smoked. He has never used smokeless tobacco. He reports that he does not drink alcohol and does not use drugs.     Past Family Hx:  Brijesh Santana family history includes Diabetes in his maternal grandmother; Hypertension in his maternal grandmother; Other in his mother.     Problem list, medications, and allergies reviewed by myself today in Epic.     Objective:     /72   Pulse 88   Temp 36.8 °C (98.3 °F) (Temporal)   Resp 18   Ht 1.626 m (5' 4\")   Wt 68 kg (150 " lb)   SpO2 99%   BMI 25.75 kg/m²     Physical Exam  Vitals reviewed.   Constitutional:       Appearance: Normal appearance.   HENT:      Head: Normocephalic and atraumatic.      Right Ear: Tympanic membrane, ear canal and external ear normal.      Left Ear: Tympanic membrane, ear canal and external ear normal.      Nose: Rhinorrhea present.      Mouth/Throat:      Mouth: Mucous membranes are moist.      Pharynx: Oropharynx is clear. No oropharyngeal exudate or posterior oropharyngeal erythema.   Eyes:      Conjunctiva/sclera: Conjunctivae normal.   Cardiovascular:      Rate and Rhythm: Normal rate.   Pulmonary:      Effort: Pulmonary effort is normal.   Lymphadenopathy:      Cervical: No cervical adenopathy.   Skin:     General: Skin is warm and dry.      Capillary Refill: Capillary refill takes less than 2 seconds.   Neurological:      Mental Status: He is alert and oriented to person, place, and time.         Assessment/Plan:     Diagnosis and associated orders:     1. Pharyngitis, unspecified etiology  POCT Mononucleosis (mono)    CULTURE THROAT    POCT SARS-COV Antigen ARINA (Symptomatic Only)    dexamethasone (DECADRON) injection (check route below) 10 mg      Comments/MDM:     • Unimpressive oropharyngeal exam, no signs of peritonsillar abscess or other insidious etiology.  Point-of-care mono is negative.  Will send throat culture as well as Covid swab.  Rapid Covid negative.  Decadron for pharyngitis.  Return if worsening.         Differential diagnosis, natural history, supportive care, and indications for immediate follow-up discussed.    Advised the patient to follow-up with the primary care physician for recheck, reevaluation, and consideration of further management.    Please note that this dictation was created using voice recognition software. I have made a reasonable attempt to correct obvious errors, but I expect that there are errors of grammar and possibly content that I did not discover before  finalizing the note.    This note was electronically signed by Octavio Welsh PA-C

## 2021-10-08 LAB
SARS-COV-2 RNA RESP QL NAA+PROBE: NOTDETECTED
SPECIMEN SOURCE: NORMAL

## 2022-02-22 ENCOUNTER — OFFICE VISIT (OUTPATIENT)
Dept: URGENT CARE | Facility: PHYSICIAN GROUP | Age: 27
End: 2022-02-22
Payer: COMMERCIAL

## 2022-02-22 VITALS
HEART RATE: 88 BPM | WEIGHT: 159 LBS | HEIGHT: 65 IN | TEMPERATURE: 97.4 F | SYSTOLIC BLOOD PRESSURE: 106 MMHG | OXYGEN SATURATION: 97 % | DIASTOLIC BLOOD PRESSURE: 74 MMHG | RESPIRATION RATE: 16 BRPM | BODY MASS INDEX: 26.49 KG/M2

## 2022-02-22 DIAGNOSIS — J02.8 ACUTE BACTERIAL PHARYNGITIS: ICD-10-CM

## 2022-02-22 DIAGNOSIS — B96.89 ACUTE BACTERIAL PHARYNGITIS: ICD-10-CM

## 2022-02-22 LAB
INT CON NEG: NEGATIVE
INT CON POS: POSITIVE
S PYO AG THROAT QL: NEGATIVE

## 2022-02-22 PROCEDURE — 87880 STREP A ASSAY W/OPTIC: CPT | Performed by: STUDENT IN AN ORGANIZED HEALTH CARE EDUCATION/TRAINING PROGRAM

## 2022-02-22 PROCEDURE — 99213 OFFICE O/P EST LOW 20 MIN: CPT | Performed by: STUDENT IN AN ORGANIZED HEALTH CARE EDUCATION/TRAINING PROGRAM

## 2022-02-22 RX ORDER — AMOXICILLIN AND CLAVULANATE POTASSIUM 875; 125 MG/1; MG/1
1 TABLET, FILM COATED ORAL 2 TIMES DAILY
Qty: 20 TABLET | Refills: 0 | Status: SHIPPED | OUTPATIENT
Start: 2022-02-22 | End: 2022-03-04

## 2022-02-22 ASSESSMENT — ENCOUNTER SYMPTOMS
CHILLS: 1
FEVER: 1
MYALGIAS: 1
COUGH: 0
SORE THROAT: 1

## 2022-02-23 NOTE — PROGRESS NOTES
"Subjective     Brijesh Santana is a 26 y.o. male who presents with Sore Throat (Ear pain, congestion since saturday)            Patient is very agreeable 26-year-old male who presents clinic with complaints of right throat pain has been on for least 2 days.  Patient has a significant history of strep pharyngitis in the past symptoms feel similar to those previous episodes.      Review of Systems   Constitutional: Positive for chills, fever and malaise/fatigue.   HENT: Positive for congestion and sore throat.    Respiratory: Negative for cough.    Musculoskeletal: Positive for myalgias.   All other systems reviewed and are negative.             Objective     /74   Pulse 88   Temp 36.3 °C (97.4 °F)   Resp 16   Ht 1.651 m (5' 5\")   Wt 72.1 kg (159 lb)   SpO2 97%   BMI 26.46 kg/m²      Physical Exam  Vitals reviewed.   Constitutional:       General: He is not in acute distress.     Appearance: Normal appearance. He is not ill-appearing.   HENT:      Mouth/Throat:      Pharynx: Oropharyngeal exudate and posterior oropharyngeal erythema present.   Lymphadenopathy:      Cervical: Cervical adenopathy present.   Neurological:      Mental Status: He is alert.                             Assessment & Plan        1. Acute bacterial pharyngitis  Patient with presumptive bacterial pharyngitis cervical adenopathy, tonsillar exudate cough, subjective fevers, absence of cough  Plan:  1.  Augmentin 875/125 for total of 10 days taken twice daily.  Patient was counseled that should his symptoms suddenly worsen or not improve we have difficulty breathing to go immediately to the nearest emergency department.  Patient Dorst understanding.  - amoxicillin-clavulanate (AUGMENTIN) 875-125 MG Tab; Take 1 Tablet by mouth 2 times a day for 10 days.  Dispense: 20 Tablet; Refill: 0                "

## 2022-03-21 ENCOUNTER — OFFICE VISIT (OUTPATIENT)
Dept: URGENT CARE | Facility: PHYSICIAN GROUP | Age: 27
End: 2022-03-21
Payer: COMMERCIAL

## 2022-03-21 VITALS
HEART RATE: 83 BPM | HEIGHT: 64 IN | SYSTOLIC BLOOD PRESSURE: 116 MMHG | TEMPERATURE: 97.7 F | DIASTOLIC BLOOD PRESSURE: 70 MMHG | RESPIRATION RATE: 16 BRPM | BODY MASS INDEX: 26.46 KG/M2 | WEIGHT: 155 LBS | OXYGEN SATURATION: 97 %

## 2022-03-21 DIAGNOSIS — R05.2 SUBACUTE COUGH: ICD-10-CM

## 2022-03-21 PROCEDURE — 99213 OFFICE O/P EST LOW 20 MIN: CPT | Performed by: PHYSICIAN ASSISTANT

## 2022-03-21 RX ORDER — METHYLPREDNISOLONE 4 MG/1
TABLET ORAL
Qty: 21 TABLET | Refills: 0 | Status: SHIPPED | OUTPATIENT
Start: 2022-03-21

## 2022-03-21 ASSESSMENT — ENCOUNTER SYMPTOMS
PALPITATIONS: 0
SHORTNESS OF BREATH: 0
FEVER: 0
DIZZINESS: 0
COUGH: 1
NAUSEA: 0
SINUS PAIN: 0
WHEEZING: 0
CHILLS: 0
ABDOMINAL PAIN: 0
SPUTUM PRODUCTION: 1
VOMITING: 0
SORE THROAT: 0
HEADACHES: 0
DIARRHEA: 0
MYALGIAS: 0
DIAPHORESIS: 0

## 2022-03-21 NOTE — PROGRESS NOTES
Subjective:   Brijesh Santana is a 26 y.o. male who presents for Cough (For 3 weeks. )      HPI:  This is a very pleasant 26-year-old male presenting to the clinic with a persistent cough x3 weeks.  Cough is deep and productive of discolored sputum.  He states besides a cough he otherwise feels well.  He does not feel sick.  He informs me his cough came on while he was on a course of antibiotics for strep throat.  He has not been running a fever.  Denies any shortness of breath or chest pain.  He is also traveled recently and believes it may be slightly due to humidity changes and potentially allergies.  He has tried a course of oral antihistamines for 1 week with minimal improvement then subsequently discontinued this.  Denies any history of asthma.  Patient is a non-smoker.    Review of Systems   Constitutional: Negative for chills, diaphoresis, fever and malaise/fatigue.   HENT: Positive for congestion. Negative for ear pain, sinus pain and sore throat.    Respiratory: Positive for cough and sputum production. Negative for shortness of breath and wheezing.    Cardiovascular: Negative for chest pain and palpitations.   Gastrointestinal: Negative for abdominal pain, diarrhea, nausea and vomiting.   Musculoskeletal: Negative for myalgias.   Neurological: Negative for dizziness and headaches.   Endo/Heme/Allergies: Negative for environmental allergies.       Medications:    • methylPREDNISolone Tbpk    Allergies: Patient has no known allergies.    Problem List: Brijesh Santana does not have any pertinent problems on file.    Surgical History:  No past surgical history on file.    Past Social Hx: Brijesh Santana  reports that he has never smoked. He has never used smokeless tobacco. He reports that he does not drink alcohol and does not use drugs.     Past Family Hx:  Brijesh Santana family history includes Diabetes in his maternal grandmother; Hypertension in his maternal  "grandmother; Other in his mother.     Problem list, medications, and allergies reviewed by myself today in Epic.     Objective:     /70   Pulse 83   Temp 36.5 °C (97.7 °F) (Temporal)   Resp 16   Ht 1.626 m (5' 4\")   Wt 70.3 kg (155 lb)   SpO2 97%   BMI 26.61 kg/m²     Physical Exam  Constitutional:       General: He is not in acute distress.     Appearance: Normal appearance. He is not ill-appearing, toxic-appearing or diaphoretic.   HENT:      Head: Normocephalic and atraumatic.      Right Ear: Tympanic membrane, ear canal and external ear normal.      Left Ear: Tympanic membrane, ear canal and external ear normal.      Nose: Nose normal. No congestion or rhinorrhea.      Mouth/Throat:      Mouth: Mucous membranes are moist.      Pharynx: No oropharyngeal exudate or posterior oropharyngeal erythema.      Comments: Minimal amounts of PND.  Posterior oropharynx nonerythematous and nonedematous.  No exudates.  Eyes:      Conjunctiva/sclera: Conjunctivae normal.   Cardiovascular:      Rate and Rhythm: Normal rate and regular rhythm.      Pulses: Normal pulses.      Heart sounds: Normal heart sounds.   Pulmonary:      Effort: Pulmonary effort is normal.      Breath sounds: Normal breath sounds. No wheezing, rhonchi or rales.   Musculoskeletal:      Cervical back: Normal range of motion. No muscular tenderness.   Lymphadenopathy:      Cervical: No cervical adenopathy.   Skin:     General: Skin is warm and dry.      Capillary Refill: Capillary refill takes less than 2 seconds.   Neurological:      Mental Status: He is alert.   Psychiatric:         Mood and Affect: Mood normal.         Thought Content: Thought content normal.           Assessment/Plan:     Comments/MDM:     • Patient has been dealing with a cough that has been intermittent for the last 3 weeks.  Cough is productive and occasionally produces discolored sputum.  He states he otherwise feels well.  Has not been running a fever.  No body aches or " chills.  No fatigue.  On exam lung sounds clear to auscultation.  No wheezes, rhonchi or rales.  SPO2 97% on room air.  No history of seasonal allergies.  Cough started while he was on a course of antibiotics.  I do have low suspicion for bacterial infection at this time.  We will start the patient on a course of oral steroids for inflammatory response.  Encouraged Flonase and antihistamine use as well.  Increase fluid intake.  Follow-up in clinic for any worsening signs or symptoms.  Please call with questions or concerns.     Diagnosis and associated orders:     1. Subacute cough  methylPREDNISolone (MEDROL DOSEPAK) 4 MG Tablet Therapy Pack            Differential diagnosis, natural history, supportive care, and indications for immediate follow-up discussed.    I personally reviewed prior external notes and test results pertinent to today's visit.     Advised the patient to follow-up with the primary care physician for recheck, reevaluation, and consideration of further management.    Please note that this dictation was created using voice recognition software. I have made reasonable attempt to correct obvious errors, but I expect that there are errors of grammar and possibly content that I did not discover before finalizing the note.    This note was electronically signed by GARIMA So PA-C

## 2022-12-03 ENCOUNTER — OFFICE VISIT (OUTPATIENT)
Dept: URGENT CARE | Facility: CLINIC | Age: 27
End: 2022-12-03
Payer: COMMERCIAL

## 2022-12-03 VITALS
OXYGEN SATURATION: 98 % | HEIGHT: 64 IN | RESPIRATION RATE: 14 BRPM | TEMPERATURE: 99.7 F | SYSTOLIC BLOOD PRESSURE: 116 MMHG | HEART RATE: 94 BPM | DIASTOLIC BLOOD PRESSURE: 82 MMHG | BODY MASS INDEX: 29.02 KG/M2 | WEIGHT: 170 LBS

## 2022-12-03 DIAGNOSIS — J22 LRTI (LOWER RESPIRATORY TRACT INFECTION): ICD-10-CM

## 2022-12-03 LAB
EXTERNAL QUALITY CONTROL: NORMAL
FLUAV+FLUBV AG SPEC QL IA: NEGATIVE
INT CON NEG: NORMAL
INT CON NEG: NORMAL
INT CON POS: NORMAL
INT CON POS: NORMAL
SARS-COV+SARS-COV-2 AG RESP QL IA.RAPID: NEGATIVE

## 2022-12-03 PROCEDURE — 99213 OFFICE O/P EST LOW 20 MIN: CPT | Performed by: STUDENT IN AN ORGANIZED HEALTH CARE EDUCATION/TRAINING PROGRAM

## 2022-12-03 PROCEDURE — 87804 INFLUENZA ASSAY W/OPTIC: CPT | Performed by: STUDENT IN AN ORGANIZED HEALTH CARE EDUCATION/TRAINING PROGRAM

## 2022-12-03 PROCEDURE — 87426 SARSCOV CORONAVIRUS AG IA: CPT | Performed by: STUDENT IN AN ORGANIZED HEALTH CARE EDUCATION/TRAINING PROGRAM

## 2022-12-03 RX ORDER — BENZONATATE 100 MG/1
200 CAPSULE ORAL 3 TIMES DAILY PRN
Qty: 30 CAPSULE | Refills: 0 | Status: SHIPPED | OUTPATIENT
Start: 2022-12-03

## 2022-12-03 RX ORDER — AMOXICILLIN 400 MG/5ML
1000 POWDER, FOR SUSPENSION ORAL 3 TIMES DAILY
Qty: 187.5 ML | Refills: 0 | Status: SHIPPED | OUTPATIENT
Start: 2022-12-03 | End: 2022-12-08

## 2022-12-04 NOTE — PROGRESS NOTES
"Subjective:   Brijesh Santana is a 27 y.o. male who presents for Cough (X 2 days, dry nose, congestion, cough, green mucus, sore throat, body aches, headache)      HPI:  Pleasant 27-year-old male presents urgent care for 2 days of worsening productive cough.  He states that he has been coughing up a large amount of green mucus.  He also has pain in his chest when he is coughing.  He does report nasal congestion and postnasal drip as well with intermittent headache.  No headache at this time though.  Reports that several other family members have had recent URIs.  Patient denies fever, chills, ear pain, ear discharge, eye discharge, eye redness, chest pain, palpitations, lower leg swelling, leg pain, hemoptysis, shortness of breath, nausea, vomiting, abdominal pain, diarrhea, dizziness, or headache.    Medications:    amoxicillin  benzonatate Caps  methylPREDNISolone Tbpk    Allergies: Patient has no known allergies.    Problem List: Brijesh Santana does not have any pertinent problems on file.    Surgical History:  No past surgical history on file.    Past Social Hx: Brijesh Santana  reports that he has never smoked. He has never used smokeless tobacco. He reports that he does not drink alcohol and does not use drugs.     Past Family Hx:  Brijesh Santana family history includes Diabetes in his maternal grandmother; Hypertension in his maternal grandmother; Other in his mother.     Problem list, medications, and allergies reviewed by myself today in Epic.     Objective:     /82 (BP Location: Left arm, Patient Position: Sitting, BP Cuff Size: Adult long)   Pulse 94   Temp 37.6 °C (99.7 °F) (Temporal)   Resp 14   Ht 1.626 m (5' 4\")   Wt 77.1 kg (170 lb)   SpO2 98%   BMI 29.18 kg/m²     Physical Exam  Vitals reviewed.   Constitutional:       General: He is not in acute distress.     Appearance: Normal appearance.   HENT:      Head: Normocephalic.      Right Ear: " Tympanic membrane, ear canal and external ear normal.      Left Ear: Tympanic membrane, ear canal and external ear normal.      Nose: Congestion present. No rhinorrhea.      Mouth/Throat:      Mouth: Mucous membranes are moist.      Pharynx: No oropharyngeal exudate or posterior oropharyngeal erythema.   Eyes:      Conjunctiva/sclera: Conjunctivae normal.      Pupils: Pupils are equal, round, and reactive to light.   Cardiovascular:      Rate and Rhythm: Normal rate and regular rhythm.      Pulses: Normal pulses.      Heart sounds: Normal heart sounds. No murmur heard.  Pulmonary:      Effort: Pulmonary effort is normal. No respiratory distress.      Breath sounds: No stridor. Rhonchi present. No wheezing or rales.   Musculoskeletal:      Cervical back: Normal range of motion.   Lymphadenopathy:      Cervical: No cervical adenopathy.   Skin:     General: Skin is warm and dry.      Capillary Refill: Capillary refill takes less than 2 seconds.      Findings: No erythema, lesion or rash.   Neurological:      General: No focal deficit present.      Mental Status: He is alert and oriented to person, place, and time.       Assessment/Plan:     Diagnosis and associated orders:     1. LRTI (lower respiratory tract infection)  POCT SARS-COV Antigen ARINA (Symptomatic only)    POCT Influenza A/B    amoxicillin (AMOXIL) 400 MG/5ML suspension    benzonatate (TESSALON) 100 MG Cap         Comments/MDM:     POCT COVID-19 negative and POCT influenza A/B-.  Patient's presentation physical exam findings are concerning for lower respiratory tract infection.  He does have rhonchi on auscultation of the lungs mainly in the left lower lung field.  Temperature slightly elevated at 99.7 °F.  We will treat with amoxicillin for suspected secondary bacterial infection.  Patient was educated on the use of amoxicillin and the possible side effects including allergic reaction.  He denies known allergies to medication and he is agreeable to the  plan.  He states that he would prefer liquid as he has had trouble swallowing these pills in the past.  He is okay with Tessalon and states that he can swallow those.  Patient stable this time and appropriate for outpatient management.  Strict ED/return precautions were given.  Patient is good understanding the signs and symptoms that warrant immediate reevaluation.         Differential diagnosis, natural history, supportive care, and indications for immediate follow-up discussed.    Advised the patient to follow-up with the primary care physician for recheck, reevaluation, and consideration of further management.    Please note that this dictation was created using voice recognition software. I have made a reasonable attempt to correct obvious errors, but I expect that there are errors of grammar and possibly content that I did not discover before finalizing the note.    Electronically signed by Charles Hernandez PA-C.

## 2023-04-22 ENCOUNTER — OFFICE VISIT (OUTPATIENT)
Dept: URGENT CARE | Facility: PHYSICIAN GROUP | Age: 28
End: 2023-04-22
Payer: COMMERCIAL

## 2023-04-22 VITALS
OXYGEN SATURATION: 97 % | HEART RATE: 67 BPM | BODY MASS INDEX: 26.46 KG/M2 | HEIGHT: 64 IN | TEMPERATURE: 97.2 F | DIASTOLIC BLOOD PRESSURE: 76 MMHG | RESPIRATION RATE: 18 BRPM | WEIGHT: 155 LBS | SYSTOLIC BLOOD PRESSURE: 118 MMHG

## 2023-04-22 DIAGNOSIS — K13.70 UNSPECIFIED LESIONS OF ORAL MUCOSA: ICD-10-CM

## 2023-04-22 PROCEDURE — 99213 OFFICE O/P EST LOW 20 MIN: CPT | Performed by: PHYSICIAN ASSISTANT

## 2023-04-22 ASSESSMENT — ENCOUNTER SYMPTOMS
SORE THROAT: 0
EYE DISCHARGE: 0
COUGH: 0
FEVER: 0
MYALGIAS: 0
VOMITING: 0
NAUSEA: 0
SHORTNESS OF BREATH: 0
DIARRHEA: 0
EYE REDNESS: 0
HEADACHES: 0

## 2023-04-22 NOTE — PROGRESS NOTES
"Subjective     Brijesh Santana is a 27 y.o. male who presents with Sore Throat (Spots on tongue, roof of mouth has red and black spots, x 6 days)            HPI    This is a new problem.  The patient presents to clinic complaining of oral lesions x6 days. The patient states he was recently sick with a viral URI in the middle of March.  The patient states that following his viral illness he developed a sensitivity to the tip of his tongue, which was worse with eating.  The patient states he then noticed \"blisters to the tip of his tongue, which has since resolved.  The patient states this past week he developed a red bump to the roof of his mouth.  The patient states this is nonpainful.  The patient reports no associated fever.  He also reports no associated sore throat.  The patient denies oral swelling.  He also denies difficulty swallowing.  The patient has not taken any OTC medications for his current symptoms.  The patient states his family has been sick with similar URI-like illnesses.    PMH:  has a past medical history of Skin lesion of hand (2/2012).  MEDS:   Current Outpatient Medications:     benzonatate (TESSALON) 100 MG Cap, Take 2 Capsules by mouth 3 times a day as needed for Cough., Disp: 30 Capsule, Rfl: 0    methylPREDNISolone (MEDROL DOSEPAK) 4 MG Tablet Therapy Pack, Follow schedule on package instructions. (Patient not taking: Reported on 12/3/2022), Disp: 21 Tablet, Rfl: 0  ALLERGIES: No Known Allergies  SURGHX: History reviewed. No pertinent surgical history.  SOCHX:  reports that he has never smoked. He has never used smokeless tobacco. He reports that he does not drink alcohol and does not use drugs.  FH: Family history was reviewed, no pertinent findings to report      Review of Systems   Constitutional:  Negative for fever.   HENT:  Negative for congestion and sore throat.    Eyes:  Negative for discharge and redness.   Respiratory:  Negative for cough and shortness of breath.  " "  Cardiovascular:  Negative for chest pain.   Gastrointestinal:  Negative for diarrhea, nausea and vomiting.   Musculoskeletal:  Negative for myalgias.   Neurological:  Negative for headaches.            Objective     /76   Pulse 67   Temp 36.2 °C (97.2 °F)   Resp 18   Ht 1.626 m (5' 4\")   Wt 70.3 kg (155 lb)   SpO2 97%   BMI 26.61 kg/m²      Physical Exam  Constitutional:       General: He is not in acute distress.     Appearance: Normal appearance. He is well-developed. He is not ill-appearing.   HENT:      Head: Normocephalic and atraumatic.      Right Ear: External ear normal.      Left Ear: External ear normal.      Mouth/Throat:      Mouth: Oral lesions present.      Pharynx: Uvula midline. No posterior oropharyngeal erythema.      Tonsils: No tonsillar exudate.        Comments:   2 lesions are present to the oral cavity.  A single erythematous lesion is present to the right side of the roof of the mouth.  A single ulcerative like lesion with surrounding erythema is present to the left side of the posterior pharynx near the left tonsil.  No additional lesions or ulcers were appreciated.  The patient reports sensitivity to the tip of the tongue.  No lesions were appreciated.  No swelling was noted.  No oral exudates or signs of oral thrush were present.  The patient's posterior pharynx was clear without erythema or tonsillar hypertrophy/exudates.  Eyes:      Extraocular Movements: Extraocular movements intact.      Conjunctiva/sclera: Conjunctivae normal.   Cardiovascular:      Rate and Rhythm: Normal rate.   Pulmonary:      Effort: Pulmonary effort is normal.   Musculoskeletal:      Cervical back: Normal range of motion and neck supple.   Skin:     General: Skin is warm and dry.   Neurological:      Mental Status: He is alert and oriented to person, place, and time.                           Assessment & Plan          1. Unspecified lesions of oral mucosa    The patient's presenting symptoms and " physical exam endings are consistent with oral lesions.  The cause of the patient's oral lesions is unknown at this time.  The patient's oral lesions could likely be related to his recent viral illness.  The patient has no signs of oral thrush.  Offered the patient a prescription for Magic mouthwash for symptomatic relief of his current symptoms.  The patient declined the prescription medication at this time.  Advised the patient to monitor for worsening signs and or symptoms.  Recommend OTC medications and supportive care for symptomatic management.  Recommend patient follow-up with PCP.  Discussed return precautions with the patient, and he verbalized understanding.    Differential diagnoses, supportive care, and indications for immediate follow-up discussed with patient.   Instructed to return to clinic or nearest emergency department for any change in condition, further concerns, or worsening of symptoms.    OTC Tylenol or Motrin for fever/discomfort.  Warm salt water rinses  Monitor for worsening signs or symptoms  Follow-up with PCP  Return to clinic or go to the ED if symptoms worsen or fail to improve, or if the patient should develop worsening/increasing/persistent oral lesions, pain/tenderness, swelling, difficulty swallowing, sore throat, decreased p.o. intake, fever/chills, and/or any concerning symptoms.    Discussed plan with the patient, and he agrees to the above.    I personally reviewed prior external notes and test results pertinent to today's visit.  I have independently reviewed and interpreted all diagnostics ordered during this urgent care visit.     Please note that this dictation was created using voice recognition software. I have made every reasonable attempt to correct obvious errors, but I expect that there may be errors of grammar and possibly content that I did not discover before finalizing the note.     This note was electronically signed by Ginna Nichols PA-C

## 2024-04-19 ENCOUNTER — APPOINTMENT (OUTPATIENT)
Dept: URGENT CARE | Facility: PHYSICIAN GROUP | Age: 29
End: 2024-04-19
Payer: COMMERCIAL

## 2024-07-26 ENCOUNTER — OFFICE VISIT (OUTPATIENT)
Dept: URGENT CARE | Facility: PHYSICIAN GROUP | Age: 29
End: 2024-07-26
Payer: COMMERCIAL

## 2024-07-26 VITALS
OXYGEN SATURATION: 98 % | WEIGHT: 174 LBS | HEART RATE: 78 BPM | RESPIRATION RATE: 16 BRPM | TEMPERATURE: 97.9 F | HEIGHT: 65 IN | SYSTOLIC BLOOD PRESSURE: 118 MMHG | BODY MASS INDEX: 28.99 KG/M2 | DIASTOLIC BLOOD PRESSURE: 70 MMHG

## 2024-07-26 DIAGNOSIS — S61.012A LACERATION OF LEFT THUMB WITHOUT DAMAGE TO NAIL, FOREIGN BODY PRESENCE UNSPECIFIED, INITIAL ENCOUNTER: ICD-10-CM

## 2024-07-26 PROCEDURE — 3078F DIAST BP <80 MM HG: CPT

## 2024-07-26 PROCEDURE — 12001 RPR S/N/AX/GEN/TRNK 2.5CM/<: CPT

## 2024-07-26 PROCEDURE — 90715 TDAP VACCINE 7 YRS/> IM: CPT

## 2024-07-26 PROCEDURE — 3074F SYST BP LT 130 MM HG: CPT

## 2024-07-26 PROCEDURE — 90471 IMMUNIZATION ADMIN: CPT

## 2024-07-26 ASSESSMENT — ENCOUNTER SYMPTOMS
SENSORY CHANGE: 0
MYALGIAS: 0
BACK PAIN: 0
TINGLING: 0
NECK PAIN: 0

## 2025-02-21 ENCOUNTER — HOSPITAL ENCOUNTER (OUTPATIENT)
Dept: LAB | Facility: MEDICAL CENTER | Age: 30
End: 2025-02-21
Attending: FAMILY MEDICINE
Payer: COMMERCIAL

## 2025-02-21 LAB
ALBUMIN SERPL BCP-MCNC: 4.5 G/DL (ref 3.2–4.9)
ALBUMIN/GLOB SERPL: 1.5 G/DL
ALP SERPL-CCNC: 64 U/L (ref 30–99)
ALT SERPL-CCNC: 26 U/L (ref 2–50)
ANION GAP SERPL CALC-SCNC: 12 MMOL/L (ref 7–16)
AST SERPL-CCNC: 24 U/L (ref 12–45)
BASOPHILS # BLD AUTO: 0.3 % (ref 0–1.8)
BASOPHILS # BLD: 0.02 K/UL (ref 0–0.12)
BILIRUB SERPL-MCNC: 0.7 MG/DL (ref 0.1–1.5)
BUN SERPL-MCNC: 15 MG/DL (ref 8–22)
CALCIUM ALBUM COR SERPL-MCNC: 9.3 MG/DL (ref 8.5–10.5)
CALCIUM SERPL-MCNC: 9.7 MG/DL (ref 8.5–10.5)
CHLORIDE SERPL-SCNC: 104 MMOL/L (ref 96–112)
CHOLEST SERPL-MCNC: 200 MG/DL (ref 100–199)
CO2 SERPL-SCNC: 23 MMOL/L (ref 20–33)
CREAT SERPL-MCNC: 1.09 MG/DL (ref 0.5–1.4)
EOSINOPHIL # BLD AUTO: 0.07 K/UL (ref 0–0.51)
EOSINOPHIL NFR BLD: 1.1 % (ref 0–6.9)
ERYTHROCYTE [DISTWIDTH] IN BLOOD BY AUTOMATED COUNT: 42.4 FL (ref 35.9–50)
FASTING STATUS PATIENT QL REPORTED: NORMAL
GFR SERPLBLD CREATININE-BSD FMLA CKD-EPI: 93 ML/MIN/1.73 M 2
GLOBULIN SER CALC-MCNC: 3 G/DL (ref 1.9–3.5)
GLUCOSE SERPL-MCNC: 92 MG/DL (ref 65–99)
HCT VFR BLD AUTO: 50 % (ref 42–52)
HDLC SERPL-MCNC: 35 MG/DL
HGB BLD-MCNC: 16.6 G/DL (ref 14–18)
IMM GRANULOCYTES # BLD AUTO: 0.03 K/UL (ref 0–0.11)
IMM GRANULOCYTES NFR BLD AUTO: 0.5 % (ref 0–0.9)
LDLC SERPL CALC-MCNC: 123 MG/DL
LYMPHOCYTES # BLD AUTO: 2.1 K/UL (ref 1–4.8)
LYMPHOCYTES NFR BLD: 32.5 % (ref 22–41)
MCH RBC QN AUTO: 30 PG (ref 27–33)
MCHC RBC AUTO-ENTMCNC: 33.2 G/DL (ref 32.3–36.5)
MCV RBC AUTO: 90.4 FL (ref 81.4–97.8)
MONOCYTES # BLD AUTO: 0.62 K/UL (ref 0–0.85)
MONOCYTES NFR BLD AUTO: 9.6 % (ref 0–13.4)
NEUTROPHILS # BLD AUTO: 3.63 K/UL (ref 1.82–7.42)
NEUTROPHILS NFR BLD: 56 % (ref 44–72)
NRBC # BLD AUTO: 0 K/UL
NRBC BLD-RTO: 0 /100 WBC (ref 0–0.2)
PLATELET # BLD AUTO: 227 K/UL (ref 164–446)
PMV BLD AUTO: 10.4 FL (ref 9–12.9)
POTASSIUM SERPL-SCNC: 4.2 MMOL/L (ref 3.6–5.5)
PROT SERPL-MCNC: 7.5 G/DL (ref 6–8.2)
RBC # BLD AUTO: 5.53 M/UL (ref 4.7–6.1)
SODIUM SERPL-SCNC: 139 MMOL/L (ref 135–145)
T4 FREE SERPL-MCNC: 1.06 NG/DL (ref 0.93–1.7)
TRIGL SERPL-MCNC: 209 MG/DL (ref 0–149)
TSH SERPL-ACNC: 0.92 UIU/ML (ref 0.35–5.5)
WBC # BLD AUTO: 6.5 K/UL (ref 4.8–10.8)

## 2025-02-21 PROCEDURE — 85025 COMPLETE CBC W/AUTO DIFF WBC: CPT

## 2025-02-21 PROCEDURE — 84439 ASSAY OF FREE THYROXINE: CPT

## 2025-02-21 PROCEDURE — 84443 ASSAY THYROID STIM HORMONE: CPT

## 2025-02-21 PROCEDURE — 84402 ASSAY OF FREE TESTOSTERONE: CPT

## 2025-02-21 PROCEDURE — 36415 COLL VENOUS BLD VENIPUNCTURE: CPT

## 2025-02-21 PROCEDURE — 84270 ASSAY OF SEX HORMONE GLOBUL: CPT

## 2025-02-21 PROCEDURE — 80061 LIPID PANEL: CPT

## 2025-02-21 PROCEDURE — 80053 COMPREHEN METABOLIC PANEL: CPT

## 2025-02-21 PROCEDURE — 84403 ASSAY OF TOTAL TESTOSTERONE: CPT

## 2025-02-22 LAB
SHBG SERPL-SCNC: 17 NMOL/L (ref 17–56)
TESTOST FREE MFR SERPL: 2.5 % (ref 1.6–2.9)
TESTOST FREE SERPL-MCNC: 91 PG/ML (ref 47–244)
TESTOST SERPL-MCNC: 371 NG/DL (ref 300–1080)

## 2025-06-18 PROCEDURE — RXMED WILLOW AMBULATORY MEDICATION CHARGE: Performed by: ORTHOPAEDIC SURGERY

## 2025-06-23 ENCOUNTER — PHARMACY VISIT (OUTPATIENT)
Dept: PHARMACY | Facility: MEDICAL CENTER | Age: 30
End: 2025-06-23
Payer: COMMERCIAL

## 2025-08-10 ENCOUNTER — APPOINTMENT (OUTPATIENT)
Dept: URGENT CARE | Facility: PHYSICIAN GROUP | Age: 30
End: 2025-08-10
Payer: COMMERCIAL